# Patient Record
Sex: FEMALE | Race: WHITE | NOT HISPANIC OR LATINO | Employment: OTHER | URBAN - METROPOLITAN AREA
[De-identification: names, ages, dates, MRNs, and addresses within clinical notes are randomized per-mention and may not be internally consistent; named-entity substitution may affect disease eponyms.]

---

## 2017-02-23 ENCOUNTER — OFFICE VISIT (OUTPATIENT)
Dept: ENDOCRINOLOGY | Facility: CLINIC | Age: 62
End: 2017-02-23

## 2017-02-23 VITALS
WEIGHT: 245.1 LBS | HEART RATE: 62 BPM | DIASTOLIC BLOOD PRESSURE: 64 MMHG | HEIGHT: 65 IN | SYSTOLIC BLOOD PRESSURE: 103 MMHG | BODY MASS INDEX: 40.84 KG/M2

## 2017-02-23 DIAGNOSIS — E66.01 MORBID OBESITY DUE TO EXCESS CALORIES (H): Primary | ICD-10-CM

## 2017-02-23 NOTE — MR AVS SNAPSHOT
After Visit Summary   2017    Megha Hyman    MRN: [id number]           Patient Information     Date Of Birth          1955        Visit Information        Provider Department      2017 2:00 PM Nisha Bey RN M [facility] Medical Weight Management        Care Instructions    Handouts: 100 calorie snack list and Protein    Continue same medications    Nisha Bey RN care coordinator [phone]          Follow-ups after your visit        Your next 10 appointments already scheduled     2017  2:00 PM CDT   (Arrive by 1:45 PM)   Return Visit with MD CLEMENT Calderón [facility] Medical Weight Management ([facility] [place])    [address]  [address] [facility] [address] [phone]              Who to contact     Please call your clinic at [phone] to:    Ask questions about your health    Make or cancel appointments    Discuss your medicines    Learn about your test results    Speak to your doctor   If you have compliments or concerns about an experience at your clinic, or if you wish to file a complaint, please contact [facility] Patient Relations at [phone] or email us at [email or web address]         Additional Information About Your Visit        MyChart Information     [employer] is an electronic gateway that provides easy, online access to your medical records. With [employer], you can request a clinic appointment, read your test results, renew a prescription or communicate with your care team.     To sign up for [employer] visit the website at [email or web address]   You will be asked to enter the access code listed below, as well as some personal information. Please follow the directions to create your username and password.     Your access code is: [email or web address]  Expires: 5/10/2017  6:30 AM     Your access code will  in 90 days. If you need help or a new code, please contact  "your [facility] or call [phone] for assistance.        Care EveryWhere ID     This is your Care EveryWhere ID. This could be used by other organizations to access your [facility] medical records  [id number]        Your Vitals Were     Pulse Height BMI (Body Mass Index)             62 1.651 m (5' 5\") 40.79 kg/m2          Blood Pressure from Last 3 Encounters:   02/23/17 103/64   12/20/16 99/61   09/07/16 98/65    Weight from Last 3 Encounters:   02/23/17 111.2 kg (245 lb 1.6 oz)   12/20/16 114.4 kg (252 lb 1.6 oz)   09/07/16 117.2 kg (258 lb 4.8 oz)              Today, you had the following     No orders found for display       Primary Care Provider    None Specified       No primary provider on file.        Thank you!     Thank you for choosing [facility] WEIGHT MANAGEMENT  for your care. Our goal is always to provide you with excellent care. Hearing back from our patients is one way we can continue to improve our services. Please take a few minutes to complete the written survey that you may receive in the mail after your visit with us. Thank you!             Your Updated Medication List - Protect others around you: Learn how to safely use, store and throw away your medicines at www.disposemymeds.org.          This list is accurate as of: 2/23/17  2:47 PM.  Always use your most recent med list.                   Brand Name Dispense Instructions for use    aspirin EC 81 MG EC tablet      Take 325 mg by mouth       ATORVASTATIN CALCIUM PO      Take 40 mg by mouth       calcium carbonate 500 MG tablet    OS-DARA 500 mg [place]. [place]     Take 600 mg by mouth 2 times daily       canaliflozin tablet    INVOKANA     Take 300 mg by mouth every morning (before breakfast)       glipiZIDE 10 MG tablet    GLUCOTROL     Take 10 mg by mouth       lisinopril 5 MG tablet    PRINIVIL/ZESTRIL     Take 5 mg by mouth       metFORMIN 750 MG 24 hr tablet    GLUCOPHAGE-XR     Take 750 mg by mouth       " * RA OMEPRAZOLE 20 MG tablet   Generic drug:  omeprazole      Take 20 mg by mouth       * omeprazole 20 MG CR capsule    priLOSEC     Take 20 mg by mouth       TOLTERODINE TARTRATE PO      Take 4 mg by mouth       topiramate 50 MG tablet    TOPAMAX    60 tablet    Take 1 tablet (50 mg) by mouth 2 times daily       TRESIBA FLEXTOUCH 100 UNIT/ML pen   Generic drug:  insulin degludec      Inject 35 Units Subcutaneous daily       * Notice:  This list has 2 medication(s) that are the same as other medications prescribed for you. Read the directions carefully, and ask your doctor or other care provider to review them with you.

## 2017-02-23 NOTE — PATIENT INSTRUCTIONS
Handouts: 100 calorie snack list and Protein    Continue same medications    Nisha Bey, RN care coordinator [phone]

## 2017-02-23 NOTE — LETTER
Date:March 2, 2017      Patient was self referred, no letter generated. Do not send.        [facility] Health Information

## 2017-02-23 NOTE — LETTER
"2/23/2017       RE: Megha Hyman  [address] [place] [address]     Dear Colleague,    Thank you for referring your patient, Megha Hyman, to the [facility] MEDICAL WEIGHT MANAGEMENT at [facility]. Please see a copy of my visit note below.          Medical Weight Management Nurse Note       Megha Hyman is a 61 year old female who is seeing Medical Weight Management for treatment of obesity related to:       8/2/2016   I have the following co-morbidities associated with obesity: Type II Diabetes, Sleep Apnea, Back Pain         CURRENT WEIGHT:   245 lbs 1.6 oz    Wt Readings from Last 4 Encounters:   02/23/17 111.2 kg (245 lb 1.6 oz)   12/20/16 114.4 kg (252 lb 1.6 oz)   09/07/16 117.2 kg (258 lb 4.8 oz)   08/02/16 119.7 kg (263 lb 14.4 oz)       Height:  5' 5\"  Body Mass Index:  Body mass index is 40.79 kg/(m^2).  Vitals:  B/P: 103/64, P: 62, R: Data Unavailable     Initial consult weight was 263 lbs 14.4 oz on 08/02/16.  Weight change since last seen on 12/20/2016 is down 7 pounds.   Total loss is 18 pounds.    Diet and Activity Changes Since Last Visit Reviewed With Patient 2/23/2017   I have made the following changes to my diet since my last visit: none   With regards to my diet, I am still struggling with: sweets   For breakfast, I typically eat: cereal with either toast or bagle   For lunch, I typically eat: if at home sandwich and cottage cheese or yogugrt if at work something from the foodcourt or frozen dinner   For supper, I typically eat: left overs from eating out or takeout dont like to cook   For snack(s), I typically eat: popcorn popcicles chips    I have made the following changes to my activity/exercise since my last visit: none   With regards to my activity/exercise, I am still struggling with: going out to do extra walking but am getting my 10,000 plus steps in at work and with my skating       ROS    MEDICATIONS:   Current " "Outpatient Prescriptions   Medication     topiramate (TOPAMAX) 50 MG tablet     lisinopril (PRINIVIL,ZESTRIL) 5 MG tablet     aspirin EC 81 MG tablet     glipiZIDE (GLUCOTROL) 10 MG tablet     metFORMIN (GLUCOPHAGE-XR) 750 MG 24 hr tablet     omeprazole (RA OMEPRAZOLE) 20 MG tablet     omeprazole (PRILOSEC) 20 MG capsule     calcium carbonate (OS-DARA 500 MG [place]. [place]) 500 MG tablet     TOLTERODINE TARTRATE PO     canaliflozin (INVOKANA) tablet     ATORVASTATIN CALCIUM PO     insulin degludec (TRESIBA FLEXTOUCH) 100 UNIT/ML pen     No current facility-administered medications for this visit.        Weight Loss Medication History Reviewed With Patient 2/23/2017   Which weight loss medications are you currently taking on a regular basis?  Topamax (topiramate)   Are you having any side effects from the weight loss medication that we have prescribed you? No       ASSESSMENT:   Megha Hyman comes into clinic today at the request of Dr. Rivera for nurse follow up.  Patient is taking Topiramate 50 mg BID without side effects. Patient says she craves carbs and if she \"sees it she eats it.\" Patient also says she is struggling with sweets. Patient declined increase in Topiramate. Patient says she doesn't like to cook.  Diet recall:  B: Frosted Flakes cereal with skim milk or sugar free hot cereal, toast, bagel, wheat frozen waffle  L: sandwich, Food court at work- hot dog and apple sauce, Lean Cuisine   D: Restaurant 1/2 portion then and 1/2 later, pasta  S: popcorn, cereal    Patient's exercise complicated by bursitis in the left hip. Receiving steroid injections. Although, pt roller skates 2 times per week and Thursdays water aerobics.       PLAN:   Decrease eating out  Calorie/low fat diet  Meal planning    Handouts: 100 calorie snack list and Protein  Continue same medications      FOLLOW-UP:    In April with Dr. Whyte.    Time: 30 min spent on evaluation, management, counseling, education, & motivational interviewing " with greater than 50 % of the total time was spent on counseling and coordinating care    This service provided today was under the direct supervision of Dr. Whyte, who was available if needed.    Nisha Bey RN    Again, thank you for allowing me to participate in the care of your patient.      Sincerely,    Nisha Bey RN

## 2017-03-01 NOTE — PROGRESS NOTES
"      Medical Weight Management Nurse Note       Megha Hyman is a 61 year old female who is seeing Medical Weight Management for treatment of obesity related to:       8/2/2016   I have the following co-morbidities associated with obesity: Type II Diabetes, Sleep Apnea, Back Pain         CURRENT WEIGHT:   245 lbs 1.6 oz    Wt Readings from Last 4 Encounters:   02/23/17 111.2 kg (245 lb 1.6 oz)   12/20/16 114.4 kg (252 lb 1.6 oz)   09/07/16 117.2 kg (258 lb 4.8 oz)   08/02/16 119.7 kg (263 lb 14.4 oz)       Height:  5' 5\"  Body Mass Index:  Body mass index is 40.79 kg/(m^2).  Vitals:  B/P: 103/64, P: 62, R: Data Unavailable     Initial consult weight was 263 lbs 14.4 oz on 08/02/16.  Weight change since last seen on 12/20/2016 is down 7 pounds.   Total loss is 18 pounds.    Diet and Activity Changes Since Last Visit Reviewed With Patient 2/23/2017   I have made the following changes to my diet since my last visit: none   With regards to my diet, I am still struggling with: sweets   For breakfast, I typically eat: cereal with either toast or bagle   For lunch, I typically eat: if at home sandwich and cottage cheese or yogugrt if at work something from the foodcourt or frozen dinner   For supper, I typically eat: left overs from eating out or takeout dont like to cook   For snack(s), I typically eat: popcorn popcicles chips    I have made the following changes to my activity/exercise since my last visit: none   With regards to my activity/exercise, I am still struggling with: going out to do extra walking but am getting my 10,000 plus steps in at work and with my skating       ROS    MEDICATIONS:   Current Outpatient Prescriptions   Medication     topiramate (TOPAMAX) 50 MG tablet     lisinopril (PRINIVIL,ZESTRIL) 5 MG tablet     aspirin EC 81 MG tablet     glipiZIDE (GLUCOTROL) 10 MG tablet     metFORMIN (GLUCOPHAGE-XR) 750 MG 24 hr tablet     omeprazole (RA OMEPRAZOLE) 20 MG tablet     omeprazole (PRILOSEC) 20 " "MG capsule     calcium carbonate (OS-DARA 500 MG [place]. [place]) 500 MG tablet     TOLTERODINE TARTRATE PO     canaliflozin (INVOKANA) tablet     ATORVASTATIN CALCIUM PO     insulin degludec (TRESIBA FLEXTOUCH) 100 UNIT/ML pen     No current facility-administered medications for this visit.        Weight Loss Medication History Reviewed With Patient 2/23/2017   Which weight loss medications are you currently taking on a regular basis?  Topamax (topiramate)   Are you having any side effects from the weight loss medication that we have prescribed you? No       ASSESSMENT:   Megha Hyman comes into clinic today at the request of Dr. Rivera for nurse follow up.  Patient is taking Topiramate 50 mg BID without side effects. Patient says she craves carbs and if she \"sees it she eats it.\" Patient also says she is struggling with sweets. Patient declined increase in Topiramate. Patient says she doesn't like to cook.  Diet recall:  B: Frosted Flakes cereal with skim milk or sugar free hot cereal, toast, bagel, wheat frozen waffle  L: sandwich, Food court at work- hot dog and apple sauce, Lean Cuisine   D: Restaurant 1/2 portion then and 1/2 later, pasta  S: popcorn, cereal    Patient's exercise complicated by bursitis in the left hip. Receiving steroid injections. Although, pt roller skates 2 times per week and Thursdays water aerobics.       PLAN:   Decrease eating out  Calorie/low fat diet  Meal planning    Handouts: 100 calorie snack list and Protein  Continue same medications      FOLLOW-UP:    In April with Dr. Whyte.    Time: 30 min spent on evaluation, management, counseling, education, & motivational interviewing with greater than 50 % of the total time was spent on counseling and coordinating care    This service provided today was under the direct supervision of Dr. Whyte, who was available if needed.    Nisha Bey RN  "

## 2017-04-25 ENCOUNTER — OFFICE VISIT (OUTPATIENT)
Dept: ENDOCRINOLOGY | Facility: CLINIC | Age: 62
End: 2017-04-25

## 2017-04-25 VITALS
WEIGHT: 241.9 LBS | BODY MASS INDEX: 40.3 KG/M2 | OXYGEN SATURATION: 96 % | SYSTOLIC BLOOD PRESSURE: 90 MMHG | DIASTOLIC BLOOD PRESSURE: 59 MMHG | HEART RATE: 75 BPM | HEIGHT: 65 IN

## 2017-04-25 DIAGNOSIS — E11.9 TYPE 2 DIABETES MELLITUS WITHOUT COMPLICATION, UNSPECIFIED LONG TERM INSULIN USE STATUS: Primary | ICD-10-CM

## 2017-04-25 ASSESSMENT — ENCOUNTER SYMPTOMS
BOWEL INCONTINENCE: 0
ABDOMINAL PAIN: 0
INCREASED ENERGY: 0
NUMBNESS: 0
HOARSE VOICE: 0
BLOOD IN STOOL: 0
JOINT SWELLING: 0
SINUS CONGESTION: 0
NAUSEA: 0
STIFFNESS: 0
DOUBLE VISION: 0
TACHYCARDIA: 0
LEG PAIN: 0
NIGHT SWEATS: 0
VOMITING: 0
CONSTIPATION: 0
LIGHT-HEADEDNESS: 0
HEADACHES: 0
EYE IRRITATION: 0
SORE THROAT: 0
COUGH DISTURBING SLEEP: 0
LOSS OF CONSCIOUSNESS: 0
NERVOUS/ANXIOUS: 0
INSOMNIA: 0
CHILLS: 0
SHORTNESS OF BREATH: 0
BRUISES/BLEEDS EASILY: 0
PANIC: 0
SPEECH CHANGE: 0
HEMATURIA: 0
ARTHRALGIAS: 1
BLOATING: 0
WEAKNESS: 0
SWOLLEN GLANDS: 0
ALTERED TEMPERATURE REGULATION: 0
SNORES LOUDLY: 0
JAUNDICE: 0
CLAUDICATION: 0
COUGH: 0
SLEEP DISTURBANCES DUE TO BREATHING: 0
EXERCISE INTOLERANCE: 0
RECTAL PAIN: 0
DECREASED LIBIDO: 0
POSTURAL DYSPNEA: 0
SINUS PAIN: 0
HYPERTENSION: 0
POLYPHAGIA: 0
HYPOTENSION: 0
MEMORY LOSS: 0
MUSCLE WEAKNESS: 0
BREAST PAIN: 0
ORTHOPNEA: 0
EXTREMITY NUMBNESS: 0
HOT FLASHES: 0
FATIGUE: 0
DISTURBANCES IN COORDINATION: 0
HEARTBURN: 0
RECTAL BLEEDING: 0
RESPIRATORY PAIN: 0
HALLUCINATIONS: 0
WEIGHT GAIN: 0
SPUTUM PRODUCTION: 0
EYE WATERING: 0
DECREASED CONCENTRATION: 0
DIZZINESS: 0
TINGLING: 0
TREMORS: 0
NECK PAIN: 0
LEG SWELLING: 0
TROUBLE SWALLOWING: 0
NECK MASS: 0
TASTE DISTURBANCE: 0
WHEEZING: 0
POLYDIPSIA: 0
PARALYSIS: 0
DIARRHEA: 0
SYNCOPE: 0
EYE REDNESS: 0
HEMOPTYSIS: 0
SMELL DISTURBANCE: 0
BREAST MASS: 0
NAIL CHANGES: 0
WEIGHT LOSS: 0
SEIZURES: 0
PALPITATIONS: 0
POOR WOUND HEALING: 0
DYSPNEA ON EXERTION: 0
MYALGIAS: 0
FLANK PAIN: 0
BACK PAIN: 1
DECREASED APPETITE: 0
SKIN CHANGES: 0
DYSURIA: 0
MUSCLE CRAMPS: 1
FEVER: 0
DIFFICULTY URINATING: 0
DEPRESSION: 0
EYE PAIN: 0

## 2017-04-25 NOTE — PROGRESS NOTES
"      Return Medical Weight Management Note     Megha Hyman  MRN:  [id number]  :  1955  JEAN:  2017    Dear primary care provider,     I had the pleasure of seeing your patient Megha Hyman.  She is a 61 year old female who I am continuing to see for treatment of obesity related to: type 2 diabetes, STEPHANIE, back pain, knee pain    She has type 2 diabetes with A1C of 6.2% (3/2017), Cr 0.62 (16). She is on glipizide 10 mg daily, metformin 750 mg daily, Invokana 300 mg daily, Tresiba insulin 28 units daily, Trulicity 1.5 mg weekly.      INTERVAL HISTORY:  Last visit with me in 2016 and with Nisha Bey in 2017. She lost about 4 lbs in 2 months to be total of 22 lbs. She said that topiramate helps reducing her appetite and desire to eat. She is able to control her eating. However, she is struggling with sweet. She is active with walking. She is walking >99903 steps many days per week. She denied side effects with topiramate.     CURRENT WEIGHT:   241 lbs 14.4 oz    Wt Readings from Last 4 Encounters:   17 109.7 kg (241 lb 14.4 oz)   17 111.2 kg (245 lb 1.6 oz)   16 114.4 kg (252 lb 1.6 oz)   16 117.2 kg (258 lb 4.8 oz)       Height:  5' 5\"  Body Mass Index:  Body mass index is 40.25 kg/(m^2).  Vitals:  B/P: 99/61, P: 79,     Initial consult weight was 263 on 2016.  Weight change since last seen on 2017 is down 4 pounds.   Total loss is 22 pounds.    Diet and Activity Changes Since Last Visit Reviewed With Patient 2017   I have made the following changes to my diet since my last visit: eating more fruit   With regards to my diet, I am still struggling with: sweets   For breakfast, I typically eat: cereal & toast   For lunch, I typically eat: if at home a sandwich if at work a frozen dinner   For supper, I typically eat: if home either some left overs from eating out or a frozen dinner if at work either something from food court or from home   For " snack(s), I typically eat: popcorn chips fruit   I have made the following changes to my activity/exercise since my last visit: none   With regards to my activity/exercise, I am still struggling with: none       Review of Systems     Constitutional:  Negative for fever, chills, weight loss, weight gain, fatigue, decreased appetite, night sweats, recent stressors, height gain, height loss, post-operative complications, incisional pain, hallucinations, increased energy, hyperactivity and confused.   HENT:  Negative for ear pain, hearing loss, tinnitus, nosebleeds, trouble swallowing, hoarse voice, mouth sores, sore throat, ear discharge, tooth pain, gum tenderness, taste disturbance, smell disturbance, hearing aid, bleeding gums, dry mouth, sinus pain, sinus congestion and neck mass.    Eyes:  Negative for double vision, pain, redness, eye pain, decreased vision, eye watering, eye bulging, eye dryness, flashing lights, spots, floaters, strabismus, tunnel vision, jaundice and eye irritation.   Respiratory:   Negative for cough, hemoptysis, sputum production, shortness of breath, wheezing, sleep disturbances due to breathing, snores loudly, respiratory pain, dyspnea on exertion, cough disturbing sleep and postural dyspnea.    Cardiovascular:  Negative for chest pain, dyspnea on exertion, palpitations, orthopnea, claudication, leg swelling, fingers/toes turn blue, hypertension, hypotension, syncope, history of heart murmur, chest pain on exertion, chest pain at rest, pacemaker, few scattered varicosities, leg pain, sleep disturbances due to breathing, tachycardia, light-headedness, exercise intolerance and edema.   Gastrointestinal:  Negative for heartburn, nausea, vomiting, abdominal pain, diarrhea, constipation, blood in stool, melena, rectal pain, bloating, hemorrhoids, bowel incontinence, jaundice, rectal bleeding, coffee ground emesis and change in stool.   Genitourinary:  Negative for bladder incontinence, dysuria,  urgency, hematuria, flank pain, vaginal discharge, difficulty urinating, genital sores, dyspareunia, decreased libido, nocturia, voiding less frequently, arousal difficulty, abnormal vaginal bleeding, excessive menstruation, menstrual changes, hot flashes, vaginal dryness and postmenopausal bleeding.   Musculoskeletal:  Positive for back pain, arthralgias and muscle cramps. Negative for myalgias, joint swelling, stiffness, neck pain, bone pain, muscle weakness and fracture.   Skin:  Negative for nail changes, itching, poor wound healing, rash, hair changes, skin changes, acne, warts, poor wound healing, scarring, flaky skin, Raynaud's phenomenon, sensitivity to sunlight and skin thickening.   Neurological:  Negative for dizziness, tingling, tremors, speech change, seizures, loss of consciousness, weakness, light-headedness, numbness, headaches, disturbances in coordination, extremity numbness, memory loss, difficulty walking and paralysis.   Endo/Heme:  Negative for anemia, swollen glands and bruises/bleeds easily.   Psychiatric/Behavioral:  Negative for depression, hallucinations, memory loss, decreased concentration, mood swings and panic attacks.    Breast:  Negative for breast discharge, breast mass, breast pain and nipple retraction.   Endocrine:  Negative for altered temperature regulation, polyphagia, polydipsia, unwanted hair growth and change in facial hair.      MEDICATIONS:   Current Outpatient Prescriptions   Medication Sig Dispense Refill     insulin degludec (TRESIBA FLEXTOUCH) 100 UNIT/ML pen Inject 28 Units Subcutaneous daily 27 mL 3     topiramate (TOPAMAX) 50 MG tablet Take 1 tablet (50 mg) by mouth 2 times daily 60 tablet 3     lisinopril (PRINIVIL,ZESTRIL) 5 MG tablet Take 5 mg by mouth       aspirin EC 81 MG tablet Take 325 mg by mouth       glipiZIDE (GLUCOTROL) 10 MG tablet Take 10 mg by mouth       metFORMIN (GLUCOPHAGE-XR) 750 MG 24 hr tablet Take 750 mg by mouth       omeprazole (RA  OMEPRAZOLE) 20 MG tablet Take 20 mg by mouth       omeprazole (PRILOSEC) 20 MG capsule Take 20 mg by mouth       calcium carbonate (OS-DARA 500 MG [place]. [place]) 500 MG tablet Take 600 mg by mouth 2 times daily       TOLTERODINE TARTRATE PO Take 4 mg by mouth       canaliflozin (INVOKANA) tablet Take 300 mg by mouth every morning (before breakfast)       ATORVASTATIN CALCIUM PO Take 40 mg by mouth           Weight Loss Medication History Reviewed With Patient 4/25/2017   Which weight loss medications are you currently taking on a regular basis?  Topamax (topiramate)   Are you having any side effects from the weight loss medication that we have prescribed you? No         ASSESSMENT:   Megha Hyman is a 61 year old female who I am continuing to see for treatment of obesity related to: type 2 diabetes, STEPHANIE, back pain, knee pain    Responds well with topiramate. She is able to control her appetite.  Still struggling with sweet and bread    PLAN:   - continue topiramate 50 mg bid -- I offered to increase topiramate to 150 mg daily but she declined today and would like to stay at 100 mg daily for now. She would like to focus on diet.  - continue to focus on reducing carb and sweet  - continue with walking    FOLLOW-UP:    4 months to see me.    Time: 15 min spent on evaluation, management, counseling, education, & motivational interviewing with greater than 50 % of the total time was spent on counseling and coordinating care    Sincerely,    Isabell Loza MD

## 2017-04-25 NOTE — LETTER
"2017       RE: Megha Hyman  [address] [place] [address]     Dear Colleague,    Thank you for referring your patient, Megha Hyman, to the [facility] MEDICAL WEIGHT MANAGEMENT at [facility]. Please see a copy of my visit note below.          Return Medical Weight Management Note     Megha Hyman  MRN:  [id number]  :  1955  JEAN:  2017    Dear primary care provider,     I had the pleasure of seeing your patient Megha Hyman.  She is a 61 year old female who I am continuing to see for treatment of obesity related to: type 2 diabetes, STEPHANIE, back pain, knee pain    She has type 2 diabetes with A1C of 6.2% (3/2017), Cr 0.62 (16). She is on glipizide 10 mg daily, metformin 750 mg daily, Invokana 300 mg daily, Tresiba insulin 28 units daily, Trulicity 1.5 mg weekly.      INTERVAL HISTORY:  Last visit with me in 2016 and with Nisha Bey in 2017. She lost about 4 lbs in 2 months to be total of 22 lbs. She said that topiramate helps reducing her appetite and desire to eat. She is able to control her eating. However, she is struggling with sweet. She is active with walking. She is walking >10714 steps many days per week. She denied side effects with topiramate.     CURRENT WEIGHT:   241 lbs 14.4 oz    Wt Readings from Last 4 Encounters:   17 109.7 kg (241 lb 14.4 oz)   17 111.2 kg (245 lb 1.6 oz)   16 114.4 kg (252 lb 1.6 oz)   16 117.2 kg (258 lb 4.8 oz)       Height:  5' 5\"  Body Mass Index:  Body mass index is 40.25 kg/(m^2).  Vitals:  B/P: 99/61, P: 79,     Initial consult weight was 263 on 2016.  Weight change since last seen on 2017 is down 4 pounds.   Total loss is 22 pounds.    Diet and Activity Changes Since Last Visit Reviewed With Patient 2017   I have made the following changes to my diet since my last visit: eating more fruit   With regards to my diet, I am still " struggling with: sweets   For breakfast, I typically eat: cereal & toast   For lunch, I typically eat: if at home a sandwich if at work a frozen dinner   For supper, I typically eat: if home either some left overs from eating out or a frozen dinner if at work either something from food court or from home   For snack(s), I typically eat: popcorn chips fruit   I have made the following changes to my activity/exercise since my last visit: none   With regards to my activity/exercise, I am still struggling with: none       Review of Systems     Constitutional:  Negative for fever, chills, weight loss, weight gain, fatigue, decreased appetite, night sweats, recent stressors, height gain, height loss, post-operative complications, incisional pain, hallucinations, increased energy, hyperactivity and confused.   HENT:  Negative for ear pain, hearing loss, tinnitus, nosebleeds, trouble swallowing, hoarse voice, mouth sores, sore throat, ear discharge, tooth pain, gum tenderness, taste disturbance, smell disturbance, hearing aid, bleeding gums, dry mouth, sinus pain, sinus congestion and neck mass.    Eyes:  Negative for double vision, pain, redness, eye pain, decreased vision, eye watering, eye bulging, eye dryness, flashing lights, spots, floaters, strabismus, tunnel vision, jaundice and eye irritation.   Respiratory:   Negative for cough, hemoptysis, sputum production, shortness of breath, wheezing, sleep disturbances due to breathing, snores loudly, respiratory pain, dyspnea on exertion, cough disturbing sleep and postural dyspnea.    Cardiovascular:  Negative for chest pain, dyspnea on exertion, palpitations, orthopnea, claudication, leg swelling, fingers/toes turn blue, hypertension, hypotension, syncope, history of heart murmur, chest pain on exertion, chest pain at rest, pacemaker, few scattered varicosities, leg pain, sleep disturbances due to breathing, tachycardia, light-headedness, exercise intolerance and edema.    Gastrointestinal:  Negative for heartburn, nausea, vomiting, abdominal pain, diarrhea, constipation, blood in stool, melena, rectal pain, bloating, hemorrhoids, bowel incontinence, jaundice, rectal bleeding, coffee ground emesis and change in stool.   Genitourinary:  Negative for bladder incontinence, dysuria, urgency, hematuria, flank pain, vaginal discharge, difficulty urinating, genital sores, dyspareunia, decreased libido, nocturia, voiding less frequently, arousal difficulty, abnormal vaginal bleeding, excessive menstruation, menstrual changes, hot flashes, vaginal dryness and postmenopausal bleeding.   Musculoskeletal:  Positive for back pain, arthralgias and muscle cramps. Negative for myalgias, joint swelling, stiffness, neck pain, bone pain, muscle weakness and fracture.   Skin:  Negative for nail changes, itching, poor wound healing, rash, hair changes, skin changes, acne, warts, poor wound healing, scarring, flaky skin, Raynaud's phenomenon, sensitivity to sunlight and skin thickening.   Neurological:  Negative for dizziness, tingling, tremors, speech change, seizures, loss of consciousness, weakness, light-headedness, numbness, headaches, disturbances in coordination, extremity numbness, memory loss, difficulty walking and paralysis.   Endo/Heme:  Negative for anemia, swollen glands and bruises/bleeds easily.   Psychiatric/Behavioral:  Negative for depression, hallucinations, memory loss, decreased concentration, mood swings and panic attacks.    Breast:  Negative for breast discharge, breast mass, breast pain and nipple retraction.   Endocrine:  Negative for altered temperature regulation, polyphagia, polydipsia, unwanted hair growth and change in facial hair.      MEDICATIONS:   Current Outpatient Prescriptions   Medication Sig Dispense Refill     insulin degludec (TRESIBA FLEXTOUCH) 100 UNIT/ML pen Inject 28 Units Subcutaneous daily 27 mL 3     topiramate (TOPAMAX) 50 MG tablet Take 1 tablet (50 mg)  by mouth 2 times daily 60 tablet 3     lisinopril (PRINIVIL,ZESTRIL) 5 MG tablet Take 5 mg by mouth       aspirin EC 81 MG tablet Take 325 mg by mouth       glipiZIDE (GLUCOTROL) 10 MG tablet Take 10 mg by mouth       metFORMIN (GLUCOPHAGE-XR) 750 MG 24 hr tablet Take 750 mg by mouth       omeprazole (RA OMEPRAZOLE) 20 MG tablet Take 20 mg by mouth       omeprazole (PRILOSEC) 20 MG capsule Take 20 mg by mouth       calcium carbonate (OS-DARA 500 MG [place]. [place]) 500 MG tablet Take 600 mg by mouth 2 times daily       TOLTERODINE TARTRATE PO Take 4 mg by mouth       canaliflozin (INVOKANA) tablet Take 300 mg by mouth every morning (before breakfast)       ATORVASTATIN CALCIUM PO Take 40 mg by mouth           Weight Loss Medication History Reviewed With Patient 4/25/2017   Which weight loss medications are you currently taking on a regular basis?  Topamax (topiramate)   Are you having any side effects from the weight loss medication that we have prescribed you? No         ASSESSMENT:   Megha Hyman is a 61 year old female who I am continuing to see for treatment of obesity related to: type 2 diabetes, STEPHANIE, back pain, knee pain    Responds well with topiramate. She is able to control her appetite.  Still struggling with sweet and bread    PLAN:   - continue topiramate 50 mg bid -- I offered to increase topiramate to 150 mg daily but she declined today and would like to stay at 100 mg daily for now. She would like to focus on diet.  - continue to focus on reducing carb and sweet  - continue with walking    FOLLOW-UP:    4 months to see me.    Time: 15 min spent on evaluation, management, counseling, education, & motivational interviewing with greater than 50 % of the total time was spent on counseling and coordinating care    Sincerely,    Isabell Loza MD    Again, thank you for allowing me to participate in the care of your patient.      Sincerely,    Isabell Loza MD

## 2017-04-25 NOTE — MR AVS SNAPSHOT
After Visit Summary   2017    Megha Hyman    MRN: [id number]           Patient Information     Date Of Birth          1955        Visit Information        Provider Department      2017 2:00 PM Isabell Loza MD M [facility] Medical Weight Management        Today's Diagnoses     Type 2 diabetes mellitus without complication, unspecified long term insulin use status (H)    -  1       Follow-ups after your visit        Your next 10 appointments already scheduled     Aug 01, 2017  3:00 PM CDT   (Arrive by 2:45 PM)   Return Visit with MD CLEMENT Calderón [facility] Medical Weight Management ([facility] [place])    [address] [address] [phone]              Who to contact     Please call your clinic at [phone] to:    Ask questions about your health    Make or cancel appointments    Discuss your medicines    Learn about your test results    Speak to your doctor   If you have compliments or concerns about an experience at your clinic, or if you wish to file a complaint, please contact [facility] Patient Relations at [phone] or email us at [email or web address]         Additional Information About Your Visit        MyChart Information     [employer] is an electronic gateway that provides easy, online access to your medical records. With [employer], you can request a clinic appointment, read your test results, renew a prescription or communicate with your care team.     To sign up for [employer] visit the website at [email or web address]   You will be asked to enter the access code listed below, as well as some personal information. Please follow the directions to create your username and password.     Your access code is: [email or web address]  Expires: 5/10/2017  7:30 AM     Your access code will  in 90 days. If you need help or a new code, please contact your [facility]  [facility] or call [phone] for assistance.        Care EveryWhere ID     This is your Care EveryWhere ID. This could be used by other organizations to access your [place] medical records  [id number]        Your Vitals Were     Pulse Height Pulse Oximetry BMI (Body Mass Index)          75 1.651 m (5' 5\") 96% 40.25 kg/m2         Blood Pressure from Last 3 Encounters:   04/25/17 90/59   02/23/17 103/64   12/20/16 99/61    Weight from Last 3 Encounters:   04/25/17 109.7 kg (241 lb 14.4 oz)   02/23/17 111.2 kg (245 lb 1.6 oz)   12/20/16 114.4 kg (252 lb 1.6 oz)              Today, you had the following     No orders found for display         Today's Medication Changes          These changes are accurate as of: 4/25/17  2:26 PM.  If you have any questions, ask your nurse or doctor.               Start taking these medicines.        Dose/Directions    TRESIBA FLEXTOUCH 100 UNIT/ML pen   Used for:  Type 2 diabetes mellitus without complication, unspecified long term insulin use status (H)   Generic drug:  insulin degludec   Started by:  Isabell Loza MD        Dose:  28 Units   Inject 28 Units Subcutaneous daily   Quantity:  27 mL   Refills:  3                Primary Care Provider    None Specified       No primary provider on file.        Thank you!     Thank you for choosing [facility] WEIGHT MANAGEMENT  for your care. Our goal is always to provide you with excellent care. Hearing back from our patients is one way we can continue to improve our services. Please take a few minutes to complete the written survey that you may receive in the mail after your visit with us. Thank you!             Your Updated Medication List - Protect others around you: Learn how to safely use, store and throw away your medicines at www.disposemymeds.org.          This list is accurate as of: 4/25/17  2:26 PM.  Always use your most recent med list.                   Brand Name Dispense Instructions for " use    aspirin EC 81 MG EC tablet      Take 325 mg by mouth       ATORVASTATIN CALCIUM PO      Take 40 mg by mouth       calcium carbonate 500 MG tablet    OS-DARA 500 mg [place]. [place]     Take 600 mg by mouth 2 times daily       canaliflozin tablet    INVOKANA     Take 300 mg by mouth every morning (before breakfast)       glipiZIDE 10 MG tablet    GLUCOTROL     Take 10 mg by mouth       lisinopril 5 MG tablet    PRINIVIL/ZESTRIL     Take 5 mg by mouth       metFORMIN 750 MG 24 hr tablet    GLUCOPHAGE-XR     Take 750 mg by mouth       * RA OMEPRAZOLE 20 MG tablet   Generic drug:  omeprazole      Take 20 mg by mouth       * omeprazole 20 MG CR capsule    priLOSEC     Take 20 mg by mouth       TOLTERODINE TARTRATE PO      Take 4 mg by mouth       topiramate 50 MG tablet    TOPAMAX    60 tablet    Take 1 tablet (50 mg) by mouth 2 times daily       TRESIBA FLEXTOUCH 100 UNIT/ML pen   Generic drug:  insulin degludec     27 mL    Inject 28 Units Subcutaneous daily       * Notice:  This list has 2 medication(s) that are the same as other medications prescribed for you. Read the directions carefully, and ask your doctor or other care provider to review them with you.

## 2017-04-25 NOTE — LETTER
Date:April 26, 2017      Patient was self referred, no letter generated. Do not send.        [facility] Health Information

## 2017-05-05 DIAGNOSIS — E66.01 MORBID OBESITY DUE TO EXCESS CALORIES (H): ICD-10-CM

## 2017-05-05 RX ORDER — TOPIRAMATE 50 MG/1
50 TABLET, FILM COATED ORAL 2 TIMES DAILY
Qty: 60 TABLET | Refills: 2 | Status: SHIPPED | OUTPATIENT
Start: 2017-05-05 | End: 2017-08-01

## 2017-05-05 NOTE — TELEPHONE ENCOUNTER
Topiramate 50 mg tabs      Last Written Prescription Date:  12-20-16  Last Fill Quantity: 60,   # refills: 3  Last Office Visit : 4-25-17  Future Office visit:  8-1-17    Kathleen M Doege RN

## 2017-06-28 ENCOUNTER — THERAPY VISIT (OUTPATIENT)
Dept: PHYSICAL THERAPY | Facility: CLINIC | Age: 62
End: 2017-06-28
Payer: COMMERCIAL

## 2017-06-28 DIAGNOSIS — M25.511 RIGHT SHOULDER PAIN, UNSPECIFIED CHRONICITY: Primary | ICD-10-CM

## 2017-06-28 PROCEDURE — 97162 PT EVAL MOD COMPLEX 30 MIN: CPT | Mod: GP | Performed by: PHYSICAL THERAPIST

## 2017-06-28 PROCEDURE — 97110 THERAPEUTIC EXERCISES: CPT | Mod: GP | Performed by: PHYSICAL THERAPIST

## 2017-06-28 NOTE — PROGRESS NOTES
[facility] Initial Evaluation - Upper Extremity     Evaluation Date:  06/28/17  Referral: Dr. Horn  Employment:  sales  Work Mechanical Stresses:  Lifting, reaching, pushing/pulling, prolonged standing, repetitive tasks, computer work   Leisure Mechanical Stresses: roller skating 2-3x/week  Baselines/Functional Disability: pain with reaching overhead and pushing clothing/carts at work; unable to lay on (R) side   VAS Score (0-10):  7      HISTORY:   Patient presents with:  Intermittent achy and sharp pain  (Constant/Intermittent, Dull/Achy/Sharp/Stabbing/Throbbing)    Pain location:  (R) upper arm  Radiates to:  none  Paraesthesia:  Increased numbness/tingling compared to what she normally have from prior surgery    Present Since: 03/17/17  Commenced as a result of: fell onto right side while roller skating due to her foot being kicked out from underneath her. States she landed on right side and landed on her arm, unsure if arm was outstretched or next to her body.  Bruised her ribs and hip.    Status:  Chronic and worsening   (new/recurrent/chronic) and (improving/not improving/worsening)   Symptoms at onset: shoulder  Constant symptoms:  none  Intermittent symptoms: shoulder/upper arm    Symptoms are made worse with: reaching, pushing, pulling, when on the move, lying (R) side   (bending, sitting, turning neck, dressing, reaching, gripping; am, as the day progresses, pm; when still, when on the move; sleeping: prone, supine, side (R), side (L); other)   Symptoms are made better with:  When stil  (bending, sitting, turning neck, dressing, reaching, gripping; am, as the day progresses, pm; when still, when on the move; sleeping: prone, supine, side (R), side (L); other)   Continued use makes the pain:  worse  (better, worse, no effect)  Disturbed night:  yes  Pain at rest:  no  Previous Episodes:  RCR and deltoid repair in 1991  Previous Treatments:  none  Handedness:  right    Specific Questions  (as reported by the patient):  Do you have pain with coughing or sneezing:  no  Overall General Health:  good  Imaging/Special testing:  none  Recent or major surgery:  No   Do you have night pain:  yes  Have you had any recent accidents:  no  Have you experienced any unexplained weight loss:  no  Pertinent medical history includes: osteoporosis, diabetes, overweight, stroke, sleep disorder/apnea   Medical allergies includes: none stated  Current medications:  See attached sheet in epic   Barriers at home: unable to sleep on right side; decreased sleep  Red Flags:  none      Site(s) for physical examination: (R) SHOULDER       OBJECTIVE EXAMINATION:         AROM:     (Pain during motion: PDM; End-range Pain:  ERP)  MOVEMENT LOSS Right Left Pain   Flexion  133  EPR   Abduction  93  ERP   External Rotation      Internal Rotation      Extension  51 62 tight   Flexion/IR wnl  -   Extension/ER wnl  -     PROM:    (Pain during motion: PDM; End-range Pain:  ERP)  MOVEMENT LOSS Right Left Pain   Flexion  170  ERP   Abduction  160  ERP   External Rotation wnl  -   Internal Rotation wnl  ERP     Resisted Testing:  (Pain during motion: PDM; End-range Pain:  ERP)   Right/Pain Left/Pain   Flexion  4/+    Abduction  4+/+    External Rotation 4/+    Internal Rotation 5/-    Extension        Repeated Tests:     Pre-test symptoms:  Right shoulder 3/10        Symptoms during        Symptoms after               Movement                   Movement:         Mechanical response:      Inc ROM Dec ROM  No effect   Extension (with wand) P stretch B   abd > flex      Flexion        Ext/IR        Adduction         ER (in 90 deg flex)          Spine:  Not assessed     Provisional Classification:  Peripheral:   Derangement shoulder, extension responder     Principle of Management:   Repeated shoulder extension every 2-3 hours; 10-15 reps       HPI                    System    Physical Exam    General     ROS    Assessment/Plan:      Patient is  a 62 year old female with right side shoulder complaints.    Patient has the following significant findings with corresponding treatment plan.                Diagnosis 1:  Right Shoulder Pain Secondary fall   Pain -  hot/cold therapy, manual therapy, self management, education, directional preference exercise and home program  Decreased ROM/flexibility - manual therapy, therapeutic exercise and home program  Decreased strength - therapeutic exercise, therapeutic activities and home program  Decreased function - therapeutic activities and home program    Therapy Evaluation Codes:   1) History comprised of:   Personal factors that impact the plan of care:      Coping style, Overall behavior pattern and Time since onset of symptoms.    Comorbidity factors that impact the plan of care are:      Pain at night/rest.     Medications impacting care: None.  2) Examination of Body Systems comprised of:   Body structures and functions that impact the plan of care:      Shoulder.   Activity limitations that impact the plan of care are:      Dressing, Lifting, Working, Sleeping and Laying down.  3) Clinical presentation characteristics are:   Evolving/Changing.  4) Decision-Making    Moderate complexity using standardized patient assessment instrument and/or measureable assessment of functional outcome.  Cumulative Therapy Evaluation is: Moderate complexity.    Previous and current functional limitations:  (See Goal Flow Sheet for this information)    Short term and Long term goals: (See Goal Flow Sheet for this information)     Communication ability:  Patient appears to be able to clearly communicate and understand verbal and written communication and follow directions correctly.  Patient has an  for communication clarity.  Treatment Explanation - The following has been discussed with the patient:   RX ordered/plan of care  Anticipated outcomes  Possible risks and side effects  This patient would benefit from PT  intervention to resume normal activities.   Rehab potential is good.    Frequency:  2 X a month, once daily  Duration:  for 3 months  Discharge Plan:  Achieve all LTG.  Independent in home treatment program.  Reach maximal therapeutic benefit.    Please refer to the daily flowsheet for treatment today, total treatment time and time spent performing 1:1 timed codes.

## 2017-06-28 NOTE — LETTER
[facility]TH[facility]IC [facility] PHYSICAL THERAPY  [address]  [facility] [address]  [phone]    2017    Re: Megha Hyman   :   1955  MRN:  [id number]   REFERRING PHYSICIAN:   Brielle Horn    [facility] PHYSICAL THERAPY    Date of Initial Evaluation:  2017  Visits:    1  Reason for Referral:  Right shoulder pain, unspecified chronicity    [facility] Initial Evaluation - Upper Extremity   Evaluation Date:  17  Referral: Dr. Horn  Employment:  "[employer]  Work Mechanical Stresses:  Lifting, reaching, pushing/pulling, prolonged standing, repetitive tasks, computer work   Leisure Mechanical Stresses: roller skating 2-3x/week  Baselines/Functional Disability: pain with reaching overhead and pushing clothing/carts at work; unable to lay on (R) side   VAS Score (0-10):  7    HISTORY:   Patient presents with:  Intermittent achy and sharp pain  (Constant/Intermittent, Dull/Achy/Sharp/Stabbing/Throbbing)  Pain location:  (R) upper arm  Radiates to:  none  Paraesthesia:  Increased numbness/tingling compared to what she normally have from prior surgery  Present Since: 17  Commenced as a result of: fell onto right side while roller skating due to her foot being kicked out from underneath her. States she landed on right side and landed on her arm, unsure if arm was outstretched or next to her body.  Bruised her ribs and hip.    Status:  Chronic and worsening   (new/recurrent/chronic) and (improving/not improving/worsening)   Symptoms at onset: shoulder  Constant symptoms:  none  Intermittent symptoms: shoulder/upper arm  Symptoms are made worse with: reaching, pushing, pulling, when on the move, lying (R) side   (bending, sitting, turning neck, dressing, reaching, gripping; am, as the day progresses, pm; when still, when on the move; sleeping: prone, supine, side (R), side (L); other)   Symptoms are made better with:   When stil  (bending, sitting, turning neck, dressing, reaching, gripping; am, as the day progresses, pm; when still, when on the move; sleeping: prone, supine, side (R), side (L); other)   Continued use makes the pain:  worse  (better, worse, no effect)  Disturbed night:  yes   Pain at rest:  no  Previous Episodes:  RCR and deltoid repair in 1991  Previous Treatments:  none  Handedness:  right        Specific Questions (as reported by the patient):  Do you have pain with coughing or sneezing:  no  Overall General Health:  good  Imaging/Special testing:  none  Recent or major surgery:  No   Do you have night pain:  yes  Have you had any recent accidents:  no  Have you experienced any unexplained weight loss:  no  Pertinent medical history includes: osteoporosis, diabetes, overweight, stroke, sleep disorder/apnea   Medical allergies includes: none stated  Current medications:  See attached sheet in epic   Barriers at home: unable to sleep on right side; decreased sleep  Red Flags:  none      Site(s) for physical examination: (R) SHOULDER       OBJECTIVE EXAMINATION:         AROM:     (Pain during motion: PDM; End-range Pain:  ERP)  MOVEMENT LOSS Right Left Pain   Flexion  133  EPR   Abduction  93  ERP   External Rotation      Internal Rotation      Extension  51 62 tight   Flexion/IR wnl  -   Extension/ER wnl  -     PROM:    (Pain during motion: PDM; End-range Pain:  ERP)  MOVEMENT LOSS Right Left Pain   Flexion  170  ERP   Abduction  160  ERP   External Rotation wnl  -   Internal Rotation wnl  ERP     Resisted Testing:  (Pain during motion: PDM; End-range Pain:  ERP)   Right/Pain Left/Pain   Flexion  4/+    Abduction  4+/+    External Rotation 4/+    Internal Rotation 5/-    Extension        Repeated Tests:     Pre-test symptoms:  Right shoulder 3/10        Symptoms during        Symptoms after               Movement                   Movement:         Mechanical response:      Inc ROM Dec ROM  No effect   Extension  (with wand) P stretch B   abd > flex      Flexion        Ext/IR        Adduction         ER (in 90 deg flex)          Spine:  Not assessed     Provisional Classification:  Peripheral:   Derangement shoulder, extension responder     Principle of Management:   Repeated shoulder extension every 2-3 hours; 10-15 reps       HPI                    System    Physical Exam    General     ROS    Assessment/Plan:      Patient is a 62 year old female with right side shoulder complaints.    Patient has the following significant findings with corresponding treatment plan.                Diagnosis 1:  Right Shoulder Pain Secondary fall   Pain -  hot/cold therapy, manual therapy, self management, education, directional preference exercise and home program  Decreased ROM/flexibility - manual therapy, therapeutic exercise and home program  Decreased strength - therapeutic exercise, therapeutic activities and home program  Decreased function - therapeutic activities and home program    Therapy Evaluation Codes:   1) History comprised of:   Personal factors that impact the plan of care:      Coping style, Overall behavior pattern and Time since onset of symptoms.    Comorbidity factors that impact the plan of care are:      Pain at night/rest.     Medications impacting care: None.  2) Examination of Body Systems comprised of:   Body structures and functions that impact the plan of care:      Shoulder.   Activity limitations that impact the plan of care are:      Dressing, Lifting, Working, Sleeping and Laying down.  3) Clinical presentation characteristics are:   Evolving/Changing.  4) Decision-Making    Moderate complexity using standardized patient assessment instrument and/or measureable assessment of functional outcome.  Cumulative Therapy Evaluation is: Moderate complexity.    Previous and current functional limitations:  (See Goal Flow Sheet for this information)    Short term and Long term goals: (See Goal Flow Sheet for this  information)     Communication ability:  Patient appears to be able to clearly communicate and understand verbal and written communication and follow directions correctly.  Patient has an  for communication clarity.  Treatment Explanation - The following has been discussed with the patient:   RX ordered/plan of care  Anticipated outcomes  Possible risks and side effects  This patient would benefit from PT intervention to resume normal activities.   Rehab potential is good.    Frequency:  2 X a month, once daily  Duration:  for 3 months  Discharge Plan:  Achieve all LTG.  Independent in home treatment program.  Reach maximal therapeutic benefit.    Please refer to the daily flowsheet for treatment today, total treatment time and time spent performing 1:1 timed codes.             Thank you for your referral.    INQUIRIES  Therapist: Belkys Nunez   [facility]  [address]  [place] [address]  Phone: [phone]  Fax: [phone]

## 2017-06-28 NOTE — LETTER
[facility] ATHLETIC [facility] PHYSICAL THERAPY  [address]  [facility] [address]  [phone]    2017    Re: Megha Hyman   :   1955  MRN:  [id number]   REFERRING PHYSICIAN:   Brielle Horn    [facility] ATHLETIC [facility] PHYSICAL THERAPY    Date of Initial Evaluation:  2017  Visits:    1  Reason for Referral:  Right shoulder pain, unspecified chronicity    [facility] Initial Evaluation - Upper Extremity   Evaluation Date:  17  Referral: Dr. Horn  Employment:  [employer]  Work Mechanical Stresses:  Lifting, reaching, pushing/pulling, prolonged standing, repetitive tasks, computer work   Leisure Mechanical Stresses: roller skating 2-3x/week  Baselines/Functional Disability: pain with reaching overhead and pushing clothing/carts at work; unable to lay on (R) side   VAS Score (0-10):  7    HISTORY:   Patient presents with:  Intermittent achy and sharp pain  (Constant/Intermittent, Dull/Achy/Sharp/Stabbing/Throbbing)  Pain location:  (R) upper arm  Radiates to:  none  Paraesthesia:  Increased numbness/tingling compared to what she normally have from prior surgery  Present Since: 17  Commenced as a result of: fell onto right side while roller skating due to her foot being kicked out from underneath her. States she landed on right side and landed on her arm, unsure if arm was outstretched or next to her body.  Bruised her ribs and hip.    Status:  Chronic and worsening   (new/recurrent/chronic) and (improving/not improving/worsening)   Symptoms at onset: shoulder  Constant symptoms:  none  Intermittent symptoms: shoulder/upper arm  Symptoms are made worse with: reaching, pushing, pulling, when on the move, lying (R) side   (bending, sitting, turning neck, dressing, reaching, gripping; am, as the day progresses, pm; when still, when on the move; sleeping: prone, supine, side (R), side (L); other)   Symptoms are made better with:   When stil  (bending, sitting, turning neck, dressing, reaching, gripping; am, as the day progresses, pm; when still, when on the move; sleeping: prone, supine, side (R), side (L); other)   Continued use makes the pain:  worse  (better, worse, no effect)  HISTORY (continued)  Disturbed night:  yes  Pain at rest:  no  Previous Episodes:  RCR and deltoid repair in 1991  Previous Treatments:  none  Handedness:  right    Specific Questions (as reported by the patient):  Do you have pain with coughing or sneezing:  no  Overall General Health:  good  Imaging/Special testing:  none  Recent or major surgery:  No   Do you have night pain:  yes  Have you had any recent accidents:  no  Have you experienced any unexplained weight loss:  no  Pertinent medical history includes: osteoporosis, diabetes, overweight, stroke, sleep disorder/apnea   Medical allergies includes: none stated  Current medications:  See attached sheet in epic   Barriers at home: unable to sleep on right side; decreased sleep  Red Flags:  none    Site(s) for physical examination: (R) SHOULDER     OBJECTIVE EXAMINATION:   AROM:     (Pain during motion: PDM; End-range Pain:  ERP)  MOVEMENT LOSS Right Left Pain   Flexion  133  EPR   Abduction  93  ERP   External Rotation      Internal Rotation      Extension  51 62 tight   Flexion/IR wnl  -   Extension/ER wnl  -   PROM:    (Pain during motion: PDM; End-range Pain:  ERP)  MOVEMENT LOSS Right Left Pain   Flexion  170  ERP   Abduction  160  ERP   External Rotation wnl  -   Internal Rotation wnl  ERP   Resisted Testing:  (Pain during motion: PDM; End-range Pain:  ERP)   Right/Pain Left/Pain   Flexion  4/+    Abduction  4+/+    External Rotation 4/+    Internal Rotation 5/-    Extension        Repeated Tests:   Pre-test symptoms:  Right shoulder 3/10      Symptoms during        Symptoms after               Movement                   Movement:         Mechanical response:      Inc ROM Dec ROM  No effect   Extension  (with wand) P stretch B   abd > flex      Flexion        Ext/IR        Adduction         ER (in 90 deg flex)          Spine:  Not assessed   Provisional Classification:  Peripheral:   Derangement shoulder, extension responder   Principle of Management:   Repeated shoulder extension every 2-3 hours; 10-15 reps     Assessment/Plan:    Patient is a 62 year old female with right side shoulder complaints.    Patient has the following significant findings with corresponding treatment plan.                Diagnosis 1:  Right Shoulder Pain Secondary fall   Pain -  hot/cold therapy, manual therapy, self management, education, directional preference exercise and home program  Decreased ROM/flexibility - manual therapy, therapeutic exercise and home program  Decreased strength - therapeutic exercise, therapeutic activities and home program  Decreased function - therapeutic activities and home program    Therapy Evaluation Codes:   1) History comprised of:   Personal factors that impact the plan of care:      Coping style, Overall behavior pattern and Time since onset of symptoms.    Comorbidity factors that impact the plan of care are:      Pain at night/rest.     Medications impacting care: None.  2) Examination of Body Systems comprised of:   Body structures and functions that impact the plan of care:      Shoulder.   Activity limitations that impact the plan of care are:      Dressing, Lifting, Working, Sleeping and Laying down.  3) Clinical presentation characteristics are:   Evolving/Changing.  4) Decision-Making    Moderate complexity using standardized patient assessment instrument and/or measureable assessment of functional outcome.  Cumulative Therapy Evaluation is: Moderate complexity.      Previous and current functional limitations:  (See Goal Flow Sheet for this information)    Short term and Long term goals: (See Goal Flow Sheet for this information)   Communication ability:  Patient appears to be able to clearly  communicate and understand verbal and written communication and follow directions correctly.  Patient has an  for communication clarity.  Treatment Explanation - The following has been discussed with the patient:   RX ordered/plan of care, Anticipated outcomes, Possible risks and side effects    This patient would benefit from PT intervention to resume normal activities.   Rehab potential is good.  Frequency:  2 X a month, once daily  Duration:  for 3 months  Discharge Plan:  Achieve all LTG.  Independent in home treatment program.  Reach maximal therapeutic benefit.    Thank you for your referral.    INQUIRIES        Therapist:  JS CasanovaT, cert MDT  [facility] PHYSICAL THERAP[facility]  [address] [facility] [address]  [facility] [address]  Phone: [phone]  Fax: [phone]

## 2017-07-20 ENCOUNTER — THERAPY VISIT (OUTPATIENT)
Dept: PHYSICAL THERAPY | Facility: CLINIC | Age: 62
End: 2017-07-20
Payer: COMMERCIAL

## 2017-07-20 DIAGNOSIS — M25.511 RIGHT SHOULDER PAIN, UNSPECIFIED CHRONICITY: ICD-10-CM

## 2017-07-20 PROCEDURE — 97110 THERAPEUTIC EXERCISES: CPT | Mod: GP | Performed by: PHYSICAL THERAPIST

## 2017-07-27 ENCOUNTER — THERAPY VISIT (OUTPATIENT)
Dept: PHYSICAL THERAPY | Facility: CLINIC | Age: 62
End: 2017-07-27
Payer: COMMERCIAL

## 2017-07-27 DIAGNOSIS — M25.511 RIGHT SHOULDER PAIN, UNSPECIFIED CHRONICITY: ICD-10-CM

## 2017-07-27 PROCEDURE — 97110 THERAPEUTIC EXERCISES: CPT | Mod: GP | Performed by: PHYSICAL THERAPIST

## 2017-08-01 ENCOUNTER — OFFICE VISIT (OUTPATIENT)
Dept: ENDOCRINOLOGY | Facility: CLINIC | Age: 62
End: 2017-08-01

## 2017-08-01 VITALS
HEIGHT: 65 IN | HEART RATE: 83 BPM | BODY MASS INDEX: 39.87 KG/M2 | WEIGHT: 239.3 LBS | DIASTOLIC BLOOD PRESSURE: 63 MMHG | SYSTOLIC BLOOD PRESSURE: 106 MMHG | OXYGEN SATURATION: 96 %

## 2017-08-01 DIAGNOSIS — E66.01 MORBID OBESITY DUE TO EXCESS CALORIES (H): ICD-10-CM

## 2017-08-01 RX ORDER — TOPIRAMATE 50 MG/1
TABLET, FILM COATED ORAL
Qty: 270 TABLET | Refills: 1 | Status: SHIPPED | OUTPATIENT
Start: 2017-08-01 | End: 2018-01-09

## 2017-08-01 RX ORDER — TIMOLOL MALEATE 5 MG/ML
1 SOLUTION/ DROPS OPHTHALMIC 2 TIMES DAILY
COMMUNITY

## 2017-08-01 RX ORDER — LATANOPROST 50 UG/ML
1 SOLUTION/ DROPS OPHTHALMIC AT BEDTIME
COMMUNITY

## 2017-08-01 RX ORDER — DORZOLAMIDE HYDROCHLORIDE AND TIMOLOL MALEATE 20; 5 MG/ML; MG/ML
1 SOLUTION/ DROPS OPHTHALMIC 2 TIMES DAILY
COMMUNITY

## 2017-08-01 ASSESSMENT — ENCOUNTER SYMPTOMS
POLYPHAGIA: 0
TINGLING: 0
MUSCLE CRAMPS: 1
NAUSEA: 0
SNORES LOUDLY: 0
EXTREMITY NUMBNESS: 0
DECREASED LIBIDO: 0
ARTHRALGIAS: 1
SKIN CHANGES: 0
FLANK PAIN: 0
COUGH DISTURBING SLEEP: 0
INCREASED ENERGY: 0
WHEEZING: 0
SHORTNESS OF BREATH: 0
SYNCOPE: 0
POOR WOUND HEALING: 0
VOMITING: 0
DYSURIA: 0
DOUBLE VISION: 0
SINUS PAIN: 0
TACHYCARDIA: 0
RECTAL BLEEDING: 0
WEAKNESS: 0
SLEEP DISTURBANCES DUE TO BREATHING: 0
HEADACHES: 0
EYE PAIN: 0
BLOATING: 0
EYE IRRITATION: 1
POSTURAL DYSPNEA: 0
BACK PAIN: 1
NERVOUS/ANXIOUS: 0
EYE REDNESS: 0
CLAUDICATION: 0
NAIL CHANGES: 0
HOT FLASHES: 0
BLOOD IN STOOL: 0
HEARTBURN: 0
FATIGUE: 0
HEMATURIA: 0
DIARRHEA: 0
DISTURBANCES IN COORDINATION: 0
MUSCLE WEAKNESS: 0
SORE THROAT: 0
NECK MASS: 0
LOSS OF CONSCIOUSNESS: 0
PANIC: 0
CHILLS: 0
SWOLLEN GLANDS: 0
EYE IRRITATION: 0
TASTE DISTURBANCE: 0
ORTHOPNEA: 0
MYALGIAS: 0
BRUISES/BLEEDS EASILY: 0
NUMBNESS: 0
WEIGHT GAIN: 0
PARALYSIS: 0
EYE WATERING: 0
LEG SWELLING: 0
LEG PAIN: 0
LIGHT-HEADEDNESS: 0
HEMOPTYSIS: 0
ABDOMINAL PAIN: 0
POLYDIPSIA: 0
TROUBLE SWALLOWING: 0
HYPOTENSION: 0
BOWEL INCONTINENCE: 0
DIFFICULTY URINATING: 0
JAUNDICE: 0
NECK PAIN: 1
DECREASED APPETITE: 0
HALLUCINATIONS: 0
HOARSE VOICE: 0
STIFFNESS: 0
SPEECH CHANGE: 0
TREMORS: 0
SEIZURES: 0
EYE PAIN: 1
HYPERTENSION: 0
CONSTIPATION: 0
RECTAL PAIN: 0
EXERCISE INTOLERANCE: 0
BREAST PAIN: 0
SINUS CONGESTION: 0
BREAST MASS: 0
DECREASED CONCENTRATION: 0
RESPIRATORY PAIN: 0
PALPITATIONS: 0
NIGHT SWEATS: 0
NECK PAIN: 0
ALTERED TEMPERATURE REGULATION: 0
WEIGHT LOSS: 0
SMELL DISTURBANCE: 0
MUSCLE CRAMPS: 0
COUGH: 0
INSOMNIA: 0
SPUTUM PRODUCTION: 0
EYE WATERING: 1
JOINT SWELLING: 0
MEMORY LOSS: 0
DEPRESSION: 0
FEVER: 0
DIZZINESS: 0
DYSPNEA ON EXERTION: 0

## 2017-08-01 NOTE — NURSING NOTE
"Chief Complaint   Patient presents with     Weight Problem     RMWM       Vitals:    08/01/17 1454   BP: 106/63   Pulse: 83   SpO2: 96%   Weight: 239 lb 4.8 oz   Height: 5' 5\"       Body mass index is 39.82 kg/(m^2).    Gillian Alvarez CMA                          "

## 2017-08-01 NOTE — LETTER
"2017       RE: Megha Hyman  [address] [facility] [address]     Dear Colleague,    Thank you for referring your patient, Megha Hyman, to the [facility] MEDICAL WEIGHT MANAGEMENT at [facility]. Please see a copy of my visit note below.          Return Medical Weight Management Note     Megha Hyman  MRN:  [id number]  :  1955  JEAN:  2017    Dear primary care provider,     I had the pleasure of seeing your patient Megha Hyman.  She is a 62 year old female who I am continuing to see for treatment of obesity related to: type 2 diabetes, STEPHANIE, back pain, knee pain    She has type 2 diabetes with A1C of 6.2% (3/2017), Cr 0.62 (16). She is on glipizide 10 mg daily, metformin 750 mg daily, Invokana 300 mg daily, Tresiba insulin 28 units daily, Trulicity 1.5 mg weekly.      INTERVAL HISTORY:  Last visit with me in 2017. She lost about 2 lbs in 2 months to be total of 24 lbs. She said that topiramate helps reducing her appetite and desire to eat. However, she has increased stress eating over the past few months. She fell while skating and hurt her right side and arm so she is doing physical therapy for this. She is walking >66818 steps about 2 days per week. She denied side effects with topiramate.     CURRENT WEIGHT:   239 lbs 4.8 oz    Wt Readings from Last 4 Encounters:   17 108.5 kg (239 lb 4.8 oz)   17 109.7 kg (241 lb 14.4 oz)   17 111.2 kg (245 lb 1.6 oz)   16 114.4 kg (252 lb 1.6 oz)       Height:  5' 5\"  Body Mass Index:  Body mass index is 39.82 kg/(m^2).  Vitals:  B/P: 99/61, P: 79,     Initial consult weight was 263 on 2016.  Weight change since last seen on 2017 is down 2 pounds.   Total loss is 24 pounds.    Diet and Activity Changes Since Last Visit Reviewed With Patient 2017   I have made the following changes to my diet since my last visit: eating more fruit   With regards to my " diet, I am still struggling with: bread   For breakfast, I typically eat: cereal   For lunch, I typically eat: sandwich with lunch meat or left overs from eating out   For supper, I typically eat: pasta, chicken or a frozen dinner   For snack(s), I typically eat: yogurt, fruit, chips, popcile,    I have made the following changes to my activity/exercise since my last visit: none   With regards to my activity/exercise, I am still struggling with: getting out to walk because of hip and knee pain in therapy       Review of Systems     Constitutional:  Negative for fever, chills, weight loss, weight gain, fatigue, decreased appetite, night sweats, recent stressors, height gain, height loss, post-operative complications, incisional pain, hallucinations, increased energy, hyperactivity and confused.   HENT:  Negative for ear pain, hearing loss, tinnitus, nosebleeds, trouble swallowing, hoarse voice, mouth sores, sore throat, ear discharge, tooth pain, gum tenderness, taste disturbance, smell disturbance, hearing aid, bleeding gums, dry mouth, sinus pain, sinus congestion and neck mass.    Eyes:  Negative for double vision, pain, redness, eye pain, decreased vision, eye watering, eye bulging, eye dryness, flashing lights, spots, floaters, strabismus, tunnel vision, jaundice and eye irritation.   Respiratory:   Negative for cough, hemoptysis, sputum production, shortness of breath, wheezing, sleep disturbances due to breathing, snores loudly, respiratory pain, dyspnea on exertion, cough disturbing sleep and postural dyspnea.    Cardiovascular:  Negative for chest pain, dyspnea on exertion, palpitations, orthopnea, claudication, leg swelling, fingers/toes turn blue, hypertension, hypotension, syncope, history of heart murmur, chest pain on exertion, chest pain at rest, pacemaker, few scattered varicosities, leg pain, sleep disturbances due to breathing, tachycardia, light-headedness, exercise intolerance and edema.    Gastrointestinal:  Negative for heartburn, nausea, vomiting, abdominal pain, diarrhea, constipation, blood in stool, melena, rectal pain, bloating, hemorrhoids, bowel incontinence, jaundice, rectal bleeding, coffee ground emesis and change in stool.   Genitourinary:  Negative for bladder incontinence, dysuria, urgency, hematuria, flank pain, vaginal discharge, difficulty urinating, genital sores, dyspareunia, decreased libido, nocturia, voiding less frequently, arousal difficulty, abnormal vaginal bleeding, excessive menstruation, menstrual changes, hot flashes, vaginal dryness and postmenopausal bleeding.   Musculoskeletal:  Positive for back pain, arthralgias and muscle cramps. Negative for myalgias, joint swelling, stiffness, neck pain, bone pain, muscle weakness and fracture.   Skin:  Negative for nail changes, itching, poor wound healing, rash, hair changes, skin changes, acne, warts, poor wound healing, scarring, flaky skin, Raynaud's phenomenon, sensitivity to sunlight and skin thickening.   Neurological:  Negative for dizziness, tingling, tremors, speech change, seizures, loss of consciousness, weakness, light-headedness, numbness, headaches, disturbances in coordination, extremity numbness, memory loss, difficulty walking and paralysis.   Endo/Heme:  Negative for anemia, swollen glands and bruises/bleeds easily.   Psychiatric/Behavioral:  Negative for depression, hallucinations, memory loss, decreased concentration, mood swings and panic attacks.    Breast:  Negative for breast discharge, breast mass, breast pain and nipple retraction.   Endocrine:  Negative for altered temperature regulation, polyphagia, polydipsia, unwanted hair growth and change in facial hair.      MEDICATIONS:   Current Outpatient Prescriptions   Medication Sig Dispense Refill     topiramate (TOPAMAX) 50 MG tablet Take 1 tablet (50 mg) by mouth 2 times daily 60 tablet 2     insulin degludec (TRESIBA FLEXTOUCH) 100 UNIT/ML pen Inject  28 Units Subcutaneous daily 27 mL 3     lisinopril (PRINIVIL,ZESTRIL) 5 MG tablet Take 5 mg by mouth       aspirin EC 81 MG tablet Take 325 mg by mouth       glipiZIDE (GLUCOTROL) 10 MG tablet Take 10 mg by mouth       metFORMIN (GLUCOPHAGE-XR) 750 MG 24 hr tablet Take 750 mg by mouth       omeprazole (RA OMEPRAZOLE) 20 MG tablet Take 20 mg by mouth       omeprazole (PRILOSEC) 20 MG capsule Take 20 mg by mouth       calcium carbonate (OS-DARA 500 MG [place]. [place]) 500 MG tablet Take 600 mg by mouth 2 times daily       TOLTERODINE TARTRATE PO Take 4 mg by mouth       canaliflozin (INVOKANA) tablet Take 300 mg by mouth every morning (before breakfast)       ATORVASTATIN CALCIUM PO Take 40 mg by mouth           Weight Loss Medication History Reviewed With Patient 8/1/2017   Which weight loss medications are you currently taking on a regular basis?  Topamax (topiramate)   Are you having any side effects from the weight loss medication that we have prescribed you? No         ASSESSMENT:   Megha Hyman is a 62 year old female who I am continuing to see for treatment of obesity related to: type 2 diabetes, STEPHANIE, back pain, knee pain    Responds well with topiramate. She is able to control her appetite but she has had stress eating over the past few months  Still struggling with sweet and bread    PLAN:   - increase topiramate to 150 mg daily   - continue to focus on reducing carb and sweet  - continue with walking    FOLLOW-UP:    5 months to see me.    Time: 15 min spent on evaluation, management, counseling, education, & motivational interviewing with greater than 50 % of the total time was spent on counseling and coordinating care    Sincerely,    Isabell Loza MD    Again, thank you for allowing me to participate in the care of your patient.      Sincerely,    Isabell Loza MD

## 2017-08-01 NOTE — MR AVS SNAPSHOT
After Visit Summary   8/1/2017    Megha Hyman    MRN: [id number]           Patient Information     Date Of Birth          1955        Visit Information        Provider Department      8/1/2017 3:00 PM Isabell Loza MD [facility] Medical Weight Management        Care Instructions    - please increase topamax to 50 mg in the morning and 100 mg in the evening  - try to walk more  - see me in 5 months    If you have any questions, please do not hesitate to call Weight management clinic at [phone] or [phone].    Sincerely,    Isabell Loza MD  Endocrinology            Follow-ups after your visit        Follow-up notes from your care team     Return in about 5 months (around 1/1/2018).      Your next 10 appointments already scheduled     Aug 03, 2017  2:50 PM CDT   [facility] Ext[facility]ity with Brayden Gorman PT   [facility] ([facility])    [address] [address]  [facility] [id number] [phone]              Who to contact     Please call your clinic at [phone] to:    Ask questions about your health    Make or cancel appointments    Discuss your medicines    Learn about your test results    Speak to your doctor   If you have compliments or concerns about an experience at your clinic, or if you wish to file a complaint, please contact [facility] Patient Relations at [phone] or email us at [email or web address]         Additional Information About Your Visit        MyChart Information     Atari is an electronic gateway that provides easy, online access to your medical records. With Atari, you can request a clinic appointment, read your test results, renew a prescription or communicate with your care team.     To sign up for Atari visit the website at [email or web address]   You will be asked to enter the access code listed below, as well as some personal  "information. Please follow the directions to create your username and password.     Your access code is: [email or web address]  Expires: 10/16/2017  6:31 AM     Your access code will  in 90 days. If you need help or a new code, please contact your [facility] or call [phone] for assistance.        Care EveryWhere ID     This is your Care EveryWhere ID. This could be used by other organizations to access your [facility] medical records  [id number]        Your Vitals Were     Pulse Height Pulse Oximetry BMI (Body Mass Index)          83 1.651 m (5' 5\") 96% 39.82 kg/m2         Blood Pressure from Last 3 Encounters:   17 106/63   17 90/59   17 103/64    Weight from Last 3 Encounters:   17 108.5 kg (239 lb 4.8 oz)   17 109.7 kg (241 lb 14.4 oz)   17 111.2 kg (245 lb 1.6 oz)              Today, you had the following     No orders found for display         Today's Medication Changes          These changes are accurate as of: 17  3:17 PM.  If you have any questions, ask your nurse or doctor.               Stop taking these medicines if you haven't already. Please contact your care team if you have questions.     canaliflozin tablet   Commonly known as:  INVOKANA   Stopped by:  Isabell Loza MD           TRESIBA FLEXTOUCH 100 UNIT/ML pen   Generic drug:  insulin degludec   Stopped by:  Isabell Loza MD                    Primary Care Provider    None Specified       No primary provider on file.        Equal Access to Services     [facility]: Hadii kenzie monteso Soroshan, waaxda luqadaha, qaybta kaalmada haven amezquita . So [facility] [phone].    ATENCIÓN: Si habla español, tiene a swenson disposición servicios gratuitos de asistencia lingüística. Llame al [phone].    We comply with applicable federal civil rights laws and [place] laws. We do not discriminate on the basis of race, color, " national origin, age, disability sex, sexual orientation or gender identity.            Thank you!     Thank you for choosing [facility] MEDICAL WEIGHT MANAGEMENT  for your care. Our goal is always to provide you with excellent care. Hearing back from our patients is one way we can continue to improve our services. Please take a few minutes to complete the written survey that you may receive in the mail after your visit with us. Thank you!             Your Updated Medication List - Protect others around you: Learn how to safely use, store and throw away your medicines at www.disposemymeds.org.          This list is accurate as of: 8/1/17  3:17 PM.  Always use your most recent med list.                   Brand Name Dispense Instructions for use Diagnosis    aspirin EC 81 MG EC tablet      Take 325 mg by mouth        ATORVASTATIN CALCIUM PO      Take 40 mg by mouth        calcium carbonate 1250 MG tablet    OS-DARA 500 mg [place]am. Ca     Take 600 mg by mouth 2 times daily        dorzolamide-timolol 2-0.5 % ophthalmic solution    COSOPT     1 drop 2 times daily        dulaglutide 1.5 MG/0.5ML pen    TRULICITY     Inject 1.5 mg Subcutaneous        glipiZIDE 10 MG tablet    GLUCOTROL     Take 10 mg by mouth        insulin glargine 100 UNIT/ML injection    LANTUS     Inject 28 Units Subcutaneous        JARDIANCE 25 MG Tabs tablet   Generic drug:  empagliflozin      Take 25 mg by mouth daily        latanoprost 0.005 % ophthalmic solution    XALATAN     1 drop At Bedtime        lisinopril 5 MG tablet    PRINIVIL/ZESTRIL     Take 5 mg by mouth        MELOXICAM PO      Take 7.5 mg by mouth        metFORMIN 750 MG 24 hr tablet    GLUCOPHAGE-XR     Take 750 mg by mouth        * RA OMEPRAZOLE 20 MG tablet   Generic drug:  omeprazole      Take 20 mg by mouth        * omeprazole 20 MG CR capsule    priLOSEC     Take 20 mg by mouth        timolol 0.5 % ophthalmic solution    TIMOPTIC     1 drop 2 times daily        TOLTERODINE TARTRATE  PO      Take 4 mg by mouth        topiramate 50 MG tablet    TOPAMAX    60 tablet    Take 1 tablet (50 mg) by mouth 2 times daily    Morbid obesity due to excess calories (H)       * Notice:  This list has 2 medication(s) that are the same as other medications prescribed for you. Read the directions carefully, and ask your doctor or other care provider to review them with you.

## 2017-08-01 NOTE — PROGRESS NOTES
"      Return Medical Weight Management Note     Megha Hyman  MRN:  [id number]  :  1955  JEAN:  2017    Dear primary care provider,     I had the pleasure of seeing your patient Megha Hyman.  She is a 62 year old female who I am continuing to see for treatment of obesity related to: type 2 diabetes, STEPHANIE, back pain, knee pain    She has type 2 diabetes with A1C of 6.2% (3/2017), Cr 0.62 (16). She is on glipizide 10 mg daily, metformin 750 mg daily, Invokana 300 mg daily, Tresiba insulin 28 units daily, Trulicity 1.5 mg weekly.      INTERVAL HISTORY:  Last visit with me in 2017. She lost about 2 lbs in 2 months to be total of 24 lbs. She said that topiramate helps reducing her appetite and desire to eat. However, she has increased stress eating over the past few months. She fell while skating and hurt her right side and arm so she is doing physical therapy for this. She is walking >78616 steps about 2 days per week. She denied side effects with topiramate.     CURRENT WEIGHT:   239 lbs 4.8 oz    Wt Readings from Last 4 Encounters:   17 108.5 kg (239 lb 4.8 oz)   17 109.7 kg (241 lb 14.4 oz)   17 111.2 kg (245 lb 1.6 oz)   16 114.4 kg (252 lb 1.6 oz)       Height:  5' 5\"  Body Mass Index:  Body mass index is 39.82 kg/(m^2).  Vitals:  B/P: 99/61, P: 79,     Initial consult weight was 263 on 2016.  Weight change since last seen on 2017 is down 2 pounds.   Total loss is 24 pounds.    Diet and Activity Changes Since Last Visit Reviewed With Patient 2017   I have made the following changes to my diet since my last visit: eating more fruit   With regards to my diet, I am still struggling with: bread   For breakfast, I typically eat: cereal   For lunch, I typically eat: sandwich with lunch meat or left overs from eating out   For supper, I typically eat: pasta, chicken or a frozen dinner   For snack(s), I typically eat: yogurt, fruit, chips, popcile,    I have made " the following changes to my activity/exercise since my last visit: none   With regards to my activity/exercise, I am still struggling with: getting out to walk because of hip and knee pain in therapy       Review of Systems     Constitutional:  Negative for fever, chills, weight loss, weight gain, fatigue, decreased appetite, night sweats, recent stressors, height gain, height loss, post-operative complications, incisional pain, hallucinations, increased energy, hyperactivity and confused.   HENT:  Negative for ear pain, hearing loss, tinnitus, nosebleeds, trouble swallowing, hoarse voice, mouth sores, sore throat, ear discharge, tooth pain, gum tenderness, taste disturbance, smell disturbance, hearing aid, bleeding gums, dry mouth, sinus pain, sinus congestion and neck mass.    Eyes:  Negative for double vision, pain, redness, eye pain, decreased vision, eye watering, eye bulging, eye dryness, flashing lights, spots, floaters, strabismus, tunnel vision, jaundice and eye irritation.   Respiratory:   Negative for cough, hemoptysis, sputum production, shortness of breath, wheezing, sleep disturbances due to breathing, snores loudly, respiratory pain, dyspnea on exertion, cough disturbing sleep and postural dyspnea.    Cardiovascular:  Negative for chest pain, dyspnea on exertion, palpitations, orthopnea, claudication, leg swelling, fingers/toes turn blue, hypertension, hypotension, syncope, history of heart murmur, chest pain on exertion, chest pain at rest, pacemaker, few scattered varicosities, leg pain, sleep disturbances due to breathing, tachycardia, light-headedness, exercise intolerance and edema.   Gastrointestinal:  Negative for heartburn, nausea, vomiting, abdominal pain, diarrhea, constipation, blood in stool, melena, rectal pain, bloating, hemorrhoids, bowel incontinence, jaundice, rectal bleeding, coffee ground emesis and change in stool.   Genitourinary:  Negative for bladder incontinence, dysuria,  urgency, hematuria, flank pain, vaginal discharge, difficulty urinating, genital sores, dyspareunia, decreased libido, nocturia, voiding less frequently, arousal difficulty, abnormal vaginal bleeding, excessive menstruation, menstrual changes, hot flashes, vaginal dryness and postmenopausal bleeding.   Musculoskeletal:  Positive for back pain, arthralgias and muscle cramps. Negative for myalgias, joint swelling, stiffness, neck pain, bone pain, muscle weakness and fracture.   Skin:  Negative for nail changes, itching, poor wound healing, rash, hair changes, skin changes, acne, warts, poor wound healing, scarring, flaky skin, Raynaud's phenomenon, sensitivity to sunlight and skin thickening.   Neurological:  Negative for dizziness, tingling, tremors, speech change, seizures, loss of consciousness, weakness, light-headedness, numbness, headaches, disturbances in coordination, extremity numbness, memory loss, difficulty walking and paralysis.   Endo/Heme:  Negative for anemia, swollen glands and bruises/bleeds easily.   Psychiatric/Behavioral:  Negative for depression, hallucinations, memory loss, decreased concentration, mood swings and panic attacks.    Breast:  Negative for breast discharge, breast mass, breast pain and nipple retraction.   Endocrine:  Negative for altered temperature regulation, polyphagia, polydipsia, unwanted hair growth and change in facial hair.      MEDICATIONS:   Current Outpatient Prescriptions   Medication Sig Dispense Refill     topiramate (TOPAMAX) 50 MG tablet Take 1 tablet (50 mg) by mouth 2 times daily 60 tablet 2     insulin degludec (TRESIBA FLEXTOUCH) 100 UNIT/ML pen Inject 28 Units Subcutaneous daily 27 mL 3     lisinopril (PRINIVIL,ZESTRIL) 5 MG tablet Take 5 mg by mouth       aspirin EC 81 MG tablet Take 325 mg by mouth       glipiZIDE (GLUCOTROL) 10 MG tablet Take 10 mg by mouth       metFORMIN (GLUCOPHAGE-XR) 750 MG 24 hr tablet Take 750 mg by mouth       omeprazole (RA  OMEPRAZOLE) 20 MG tablet Take 20 mg by mouth       omeprazole (PRILOSEC) 20 MG capsule Take 20 mg by mouth       calcium carbonate (OS-DARA 500 MG [place]. CA) 500 MG tablet Take 600 mg by mouth 2 times daily       TOLTERODINE TARTRATE PO Take 4 mg by mouth       canaliflozin (INVOKANA) tablet Take 300 mg by mouth every morning (before breakfast)       ATORVASTATIN CALCIUM PO Take 40 mg by mouth           Weight Loss Medication History Reviewed With Patient 8/1/2017   Which weight loss medications are you currently taking on a regular basis?  Topamax (topiramate)   Are you having any side effects from the weight loss medication that we have prescribed you? No         ASSESSMENT:   Megha Hyman is a 62 year old female who I am continuing to see for treatment of obesity related to: type 2 diabetes, STEPHANIE, back pain, knee pain    Responds well with topiramate. She is able to control her appetite but she has had stress eating over the past few months  Still struggling with sweet and bread    PLAN:   - increase topiramate to 150 mg daily   - continue to focus on reducing carb and sweet  - continue with walking    FOLLOW-UP:    5 months to see me.    Time: 15 min spent on evaluation, management, counseling, education, & motivational interviewing with greater than 50 % of the total time was spent on counseling and coordinating care    Sincerely,    Isabell Loza MD

## 2017-08-01 NOTE — PATIENT INSTRUCTIONS
- please increase topamax to 50 mg in the morning and 100 mg in the evening  - try to walk more  - see me in 5 months    If you have any questions, please do not hesitate to call Weight management clinic at [phone] or [phone].    Sincerely,    Isabell Loza MD  Endocrinology

## 2017-08-01 NOTE — LETTER
Date:August 2, 2017      Patient was self referred, no letter generated. Do not send.        [facility] Health Information

## 2017-11-03 PROBLEM — M25.511 RIGHT SHOULDER PAIN, UNSPECIFIED CHRONICITY: Status: RESOLVED | Noted: 2017-06-28 | Resolved: 2017-11-03

## 2017-11-03 NOTE — PROGRESS NOTES
"Discharge Note    Progress reporting period is from initial eval to Jul 27, 2017.     Megha failed to return for next follow up visit and current status is unknown.  Please see information below for last relevant information on current status.  Patient seen for Rxs Used: 3 visits.  SUBJECTIVE  Subjective changes noted by patient:  Subjective: Pt reports shoulder pain gets worse while doing and after exercise.  Noticing more pain with reaching.   Has been doing HEP 3 x daily, 15-20 reps each time.  Icing afterwards does help to relieve pain.  .  Current pain level is Current Pain level: 7/10.     Previous pain level was   .   Changes in function:  Yes (See Goal flowsheet attached for changes in current functional level)  Adverse reaction to treatment or activity: None    OBJECTIVE  Changes noted in objective findings: Objective: AROM:  Flex 150; Abd 145; Flex/ER WNL; Ext/IR 1\" difference.  Strength:  FF 4+/5; Abd 4/5; ER 5/5; IR 5/5     ASSESSMENT/PLAN  Diagnosis: (R) shldr pain (fall)   DIAGP:  The encounter diagnosis was Right shoulder pain, unspecified chronicity.  Updated problem list and treatment plan:   Pain - HEP  Decreased ROM/flexibility - HEP  Decreased function - HEP  Decreased strength - HEP  STG/LTGs have been met or progress has been made towards goals:  Yes, please see goal flowsheet for most current information  Assessment of Progress: current status is unknown.  Last current status: Assessment of progress: Pt is progressing slower than anticipated   Self Management Plans:  HEP  I have re-evaluated this patient and find that the nature, scope, duration and intensity of the therapy is appropriate for the medical condition of the patient.  Megha continues to require the following intervention to meet STG and LTG's:  HEP.    Recommendations:  Discharge with current home program.  Patient to follow up with MD as needed.    Please refer to the daily flowsheet for treatment today, total treatment time " and time spent performing 1:1 timed codes.

## 2018-01-09 ENCOUNTER — OFFICE VISIT (OUTPATIENT)
Dept: ENDOCRINOLOGY | Facility: CLINIC | Age: 63
End: 2018-01-09
Payer: COMMERCIAL

## 2018-01-09 VITALS
SYSTOLIC BLOOD PRESSURE: 96 MMHG | HEIGHT: 65 IN | DIASTOLIC BLOOD PRESSURE: 57 MMHG | BODY MASS INDEX: 38.27 KG/M2 | HEART RATE: 56 BPM | OXYGEN SATURATION: 96 % | WEIGHT: 229.7 LBS

## 2018-01-09 DIAGNOSIS — E66.01 MORBID OBESITY DUE TO EXCESS CALORIES (H): ICD-10-CM

## 2018-01-09 RX ORDER — TOPIRAMATE 50 MG/1
TABLET, FILM COATED ORAL
Qty: 270 TABLET | Refills: 2 | Status: SHIPPED | OUTPATIENT
Start: 2018-01-09 | End: 2018-06-25

## 2018-01-09 RX ORDER — OXYBUTYNIN CHLORIDE 10 MG/1
TABLET, EXTENDED RELEASE ORAL
COMMUNITY
Start: 2017-10-30

## 2018-01-09 ASSESSMENT — ENCOUNTER SYMPTOMS
TACHYCARDIA: 0
CONSTIPATION: 0
POOR WOUND HEALING: 0
RESPIRATORY PAIN: 0
DECREASED CONCENTRATION: 0
SHORTNESS OF BREATH: 0
CLAUDICATION: 0
RECTAL BLEEDING: 0
HYPERTENSION: 0
BLOOD IN STOOL: 0
NAUSEA: 0
INCREASED ENERGY: 0
VOMITING: 0
HEMOPTYSIS: 0
MYALGIAS: 0
BRUISES/BLEEDS EASILY: 0
DIFFICULTY URINATING: 0
TROUBLE SWALLOWING: 0
LIGHT-HEADEDNESS: 0
STIFFNESS: 0
DYSURIA: 0
WHEEZING: 0
SNORES LOUDLY: 0
HEMATURIA: 0
SMELL DISTURBANCE: 0
TREMORS: 0
INSOMNIA: 0
NUMBNESS: 0
DEPRESSION: 0
LEG SWELLING: 0
SYNCOPE: 0
DIZZINESS: 0
RECTAL PAIN: 0
BREAST MASS: 0
SPEECH CHANGE: 0
DISTURBANCES IN COORDINATION: 0
FATIGUE: 0
SEIZURES: 0
TINGLING: 0
NECK MASS: 0
ARTHRALGIAS: 1
PARALYSIS: 0
EYE WATERING: 0
FEVER: 0
EXERCISE INTOLERANCE: 0
POLYPHAGIA: 0
DOUBLE VISION: 0
COUGH: 0
EXTREMITY NUMBNESS: 0
JAUNDICE: 0
EYE IRRITATION: 0
LOSS OF CONSCIOUSNESS: 0
SWOLLEN GLANDS: 0
COUGH DISTURBING SLEEP: 0
ORTHOPNEA: 0
HOT FLASHES: 0
EYE PAIN: 0
DIARRHEA: 0
NIGHT SWEATS: 0
PALPITATIONS: 0
BOWEL INCONTINENCE: 0
HOARSE VOICE: 0
NERVOUS/ANXIOUS: 0
NECK PAIN: 0
BACK PAIN: 1
MUSCLE CRAMPS: 1
CHILLS: 0
SORE THROAT: 0
SKIN CHANGES: 0
SPUTUM PRODUCTION: 0
WEIGHT LOSS: 0
HEADACHES: 0
FLANK PAIN: 0
DECREASED APPETITE: 0
LEG PAIN: 0
MUSCLE WEAKNESS: 0
PANIC: 0
ABDOMINAL PAIN: 0
ALTERED TEMPERATURE REGULATION: 0
EYE REDNESS: 0
SINUS PAIN: 0
DECREASED LIBIDO: 0
POLYDIPSIA: 0
BREAST PAIN: 0
SINUS CONGESTION: 0
DYSPNEA ON EXERTION: 0
WEIGHT GAIN: 0
MEMORY LOSS: 0
TASTE DISTURBANCE: 0
BLOATING: 0
HEARTBURN: 0
SLEEP DISTURBANCES DUE TO BREATHING: 0
WEAKNESS: 0
HALLUCINATIONS: 0
POSTURAL DYSPNEA: 0
JOINT SWELLING: 0
HYPOTENSION: 0
NAIL CHANGES: 0

## 2018-01-09 NOTE — LETTER
"2018       RE: Megha Hyman  [address] [facility] [address]     Dear Colleague,    Thank you for referring your patient, Megha Hyman, to the [facility] MEDICAL WEIGHT MANAGEMENT at [facility]. Please see a copy of my visit note below.          Return Medical Weight Management Note     Megha Hyman  MRN:  [id number]  :  1955  JEAN:  2018    Dear primary care provider,     I had the pleasure of seeing your patient Megha Hyman.  She is a 62 year old female who I am continuing to see for treatment of obesity related to: type 2 diabetes, STEPHANIE, back pain, knee pain    She has type 2 diabetes with A1C of 6.7% (10/2017), Cr 0.7 (2017). She is on glipizide 10 mg daily, metformin 750 mg daily, Jardiance 25 mg daily, Tresiba insulin 28 units daily, Trulicity 1.5 mg weekly.      INTERVAL HISTORY:  Last visit with me in 2017. She lost about 10 lbs in 5 months to be total of 34 lbs. She said that topiramate helps reducing her appetite and desire to eat. She is taking topiramate 50 mg in the AM and 100 mg in PM. She tries to portion her meal when she ate out. She does not cook. She usually uses meal replacement method.     She still has stress and emotional eating. She is walking >23024 steps about 2 days per week when she works. She denied side effects with topiramate.     She was put on gabapentin for neuropathy.    CURRENT WEIGHT:   229 lbs 11.2 oz    Wt Readings from Last 4 Encounters:   17 108.5 kg (239 lb 4.8 oz)   17 109.7 kg (241 lb 14.4 oz)   17 111.2 kg (245 lb 1.6 oz)   16 114.4 kg (252 lb 1.6 oz)       Height:  5' 5\"  Body Mass Index:  Body mass index is 38.22 kg/(m^2).  Vitals:  B/P: 99/61, P: 79,     Initial consult weight was 263 on 2016.  Weight change since last seen on 2017 is down 10 pounds.   Total loss is 34 pounds.    Diet and Activity Changes Since Last Visit Reviewed With Patient " 1/9/2018   I have made the following changes to my diet since my last visit: eating more salads and friets   With regards to my diet, I am still struggling with: time as to when to eat. my work hours are all di the placeand its hard to eat on a regular time.   For breakfast, I typically eat: cerale or lw surgar hot oat meal or low janie waffels   For lunch, I typically eat: if at work food court or frozen dinners if at home sandwiches   For supper, I typically eat: -   For snack(s), I typically eat: -   I have made the following changes to my activity/exercise since my last visit: -   With regards to my activity/exercise, I am still struggling with: -       Review of Systems     Constitutional:  Negative for fever, chills, weight loss, weight gain, fatigue, decreased appetite, night sweats, recent stressors, height gain, height loss, post-operative complications, incisional pain, hallucinations, increased energy, hyperactivity and confused.   HENT:  Negative for ear pain, hearing loss, tinnitus, nosebleeds, trouble swallowing, hoarse voice, mouth sores, sore throat, ear discharge, tooth pain, gum tenderness, taste disturbance, smell disturbance, hearing aid, bleeding gums, dry mouth, sinus pain, sinus congestion and neck mass.    Eyes:  Negative for double vision, pain, redness, eye pain, decreased vision, eye watering, eye bulging, eye dryness, flashing lights, spots, floaters, strabismus, tunnel vision, jaundice and eye irritation.   Respiratory:   Negative for cough, hemoptysis, sputum production, shortness of breath, wheezing, sleep disturbances due to breathing, snores loudly, respiratory pain, dyspnea on exertion, cough disturbing sleep and postural dyspnea.    Cardiovascular:  Negative for chest pain, dyspnea on exertion, palpitations, orthopnea, claudication, leg swelling, fingers/toes turn blue, hypertension, hypotension, syncope, history of heart murmur, chest pain on exertion, chest pain at rest,  pacemaker, few scattered varicosities, leg pain, sleep disturbances due to breathing, tachycardia, light-headedness, exercise intolerance and edema.   Gastrointestinal:  Negative for heartburn, nausea, vomiting, abdominal pain, diarrhea, constipation, blood in stool, melena, rectal pain, bloating, hemorrhoids, bowel incontinence, jaundice, rectal bleeding, coffee ground emesis and change in stool.   Genitourinary:  Negative for bladder incontinence, dysuria, urgency, hematuria, flank pain, vaginal discharge, difficulty urinating, genital sores, dyspareunia, decreased libido, nocturia, voiding less frequently, arousal difficulty, abnormal vaginal bleeding, excessive menstruation, menstrual changes, hot flashes, vaginal dryness and postmenopausal bleeding.   Musculoskeletal:  Positive for back pain, arthralgias and muscle cramps. Negative for myalgias, joint swelling, stiffness, neck pain, bone pain, muscle weakness and fracture.   Skin:  Negative for nail changes, itching, poor wound healing, rash, hair changes, skin changes, acne, warts, poor wound healing, scarring, flaky skin, Raynaud's phenomenon, sensitivity to sunlight and skin thickening.   Neurological:  Negative for dizziness, tingling, tremors, speech change, seizures, loss of consciousness, weakness, light-headedness, numbness, headaches, disturbances in coordination, extremity numbness, memory loss, difficulty walking and paralysis.   Endo/Heme:  Negative for anemia, swollen glands and bruises/bleeds easily.   Psychiatric/Behavioral:  Negative for depression, hallucinations, memory loss, decreased concentration, mood swings and panic attacks.    Breast:  Negative for breast discharge, breast mass, breast pain and nipple retraction.   Endocrine:  Negative for altered temperature regulation, polyphagia, polydipsia, unwanted hair growth and change in facial hair.      MEDICATIONS:   Current Outpatient Prescriptions   Medication Sig Dispense Refill      dulaglutide (TRULICITY) 1.5 MG/0.5ML pen Inject 1.5 mg Subcutaneous       MELOXICAM PO Take 7.5 mg by mouth       insulin glargine (LANTUS) 100 UNIT/ML injection Inject 28 Units Subcutaneous       empagliflozin (JARDIANCE) 25 MG TABS tablet Take 25 mg by mouth daily       timolol (TIMOPTIC) 0.5 % ophthalmic solution 1 drop 2 times daily       latanoprost (XALATAN) 0.005 % ophthalmic solution 1 drop At Bedtime       dorzolamide-timolol (COSOPT) 2-0.5 % ophthalmic solution 1 drop 2 times daily       topiramate (TOPAMAX) 50 MG tablet 50 mg in the morning and 100 mg in the evening 270 tablet 1     lisinopril (PRINIVIL,ZESTRIL) 5 MG tablet Take 5 mg by mouth       aspirin EC 81 MG tablet Take 325 mg by mouth       glipiZIDE (GLUCOTROL) 10 MG tablet Take 10 mg by mouth       metFORMIN (GLUCOPHAGE-XR) 750 MG 24 hr tablet Take 750 mg by mouth       omeprazole (RA OMEPRAZOLE) 20 MG tablet Take 20 mg by mouth       omeprazole (PRILOSEC) 20 MG capsule Take 20 mg by mouth       calcium carbonate (OS-DARA 500 MG [place]) 500 MG tablet Take 600 mg by mouth 2 times daily       TOLTERODINE TARTRATE PO Take 4 mg by mouth       ATORVASTATIN CALCIUM PO Take 40 mg by mouth           Weight Loss Medication History Reviewed With Patient 8/1/2017   Which weight loss medications are you currently taking on a regular basis?  Topamax (topiramate)   Are you having any side effects from the weight loss medication that we have prescribed you? No     Lab reviewed    ASSESSMENT:   Megha Hyman is a 62 year old female who I am continuing to see for treatment of obesity related to: type 2 diabetes, STEPHANIE, back pain, knee pain    Responds well with topiramate. She is able to control her appetite.  Still struggling with sweet and bread  +stress/emotional eating    Lost 10 lbs in 5 months.    PLAN:   - continue topiramate to 150 mg daily   - continue to focus on reducing carb and sweet  - continue with smaller food portion  - continue with  walking    FOLLOW-UP:    4-5 months to see me.    Time: 15 min spent on evaluation, management, counseling, education, & motivational interviewing with greater than 50 % of the total time was spent on counseling and coordinating care    Sincerely,    Isabell Loza MD    Again, thank you for allowing me to participate in the care of your patient.      Sincerely,    Isabell Loza MD

## 2018-01-09 NOTE — LETTER
Date:January 10, 2018      Patient was self referred, no letter generated. Do not send.        [facility] Health Information

## 2018-01-09 NOTE — PROGRESS NOTES
"      Return Medical Weight Management Note     Megha Hyman  MRN:  [id number]  :  1955  JEAN:  2018    Dear primary care provider,     I had the pleasure of seeing your patient Megha Hyman.  She is a 62 year old female who I am continuing to see for treatment of obesity related to: type 2 diabetes, STEPHANIE, back pain, knee pain    She has type 2 diabetes with A1C of 6.7% (10/2017), Cr 0.7 (2017). She is on glipizide 10 mg daily, metformin 750 mg daily, Jardiance 25 mg daily, Tresiba insulin 28 units daily, Trulicity 1.5 mg weekly.      INTERVAL HISTORY:  Last visit with me in 2017. She lost about 10 lbs in 5 months to be total of 34 lbs. She said that topiramate helps reducing her appetite and desire to eat. She is taking topiramate 50 mg in the AM and 100 mg in PM. She tries to portion her meal when she ate out. She does not cook. She usually uses meal replacement method.     She still has stress and emotional eating. She is walking >41701 steps about 2 days per week when she works. She denied side effects with topiramate.     She was put on gabapentin for neuropathy.    CURRENT WEIGHT:   229 lbs 11.2 oz    Wt Readings from Last 4 Encounters:   17 108.5 kg (239 lb 4.8 oz)   17 109.7 kg (241 lb 14.4 oz)   17 111.2 kg (245 lb 1.6 oz)   16 114.4 kg (252 lb 1.6 oz)       Height:  5' 5\"  Body Mass Index:  Body mass index is 38.22 kg/(m^2).  Vitals:  B/P: 99/61, P: 79,     Initial consult weight was 263 on 2016.  Weight change since last seen on 2017 is down 10 pounds.   Total loss is 34 pounds.    Diet and Activity Changes Since Last Visit Reviewed With Patient 2018   I have made the following changes to my diet since my last visit: eating more salads and friets   With regards to my diet, I am still struggling with: time as to when to eat. my work hours are all di the placeand its hard to eat on a regular time.   For breakfast, I typically eat: ana or clarke " surgar hot oat meal or low janie waffels   For lunch, I typically eat: if at work food court or frozen dinners if at home sandwiches   For supper, I typically eat: -   For snack(s), I typically eat: -   I have made the following changes to my activity/exercise since my last visit: -   With regards to my activity/exercise, I am still struggling with: -       Review of Systems     Constitutional:  Negative for fever, chills, weight loss, weight gain, fatigue, decreased appetite, night sweats, recent stressors, height gain, height loss, post-operative complications, incisional pain, hallucinations, increased energy, hyperactivity and confused.   HENT:  Negative for ear pain, hearing loss, tinnitus, nosebleeds, trouble swallowing, hoarse voice, mouth sores, sore throat, ear discharge, tooth pain, gum tenderness, taste disturbance, smell disturbance, hearing aid, bleeding gums, dry mouth, sinus pain, sinus congestion and neck mass.    Eyes:  Negative for double vision, pain, redness, eye pain, decreased vision, eye watering, eye bulging, eye dryness, flashing lights, spots, floaters, strabismus, tunnel vision, jaundice and eye irritation.   Respiratory:   Negative for cough, hemoptysis, sputum production, shortness of breath, wheezing, sleep disturbances due to breathing, snores loudly, respiratory pain, dyspnea on exertion, cough disturbing sleep and postural dyspnea.    Cardiovascular:  Negative for chest pain, dyspnea on exertion, palpitations, orthopnea, claudication, leg swelling, fingers/toes turn blue, hypertension, hypotension, syncope, history of heart murmur, chest pain on exertion, chest pain at rest, pacemaker, few scattered varicosities, leg pain, sleep disturbances due to breathing, tachycardia, light-headedness, exercise intolerance and edema.   Gastrointestinal:  Negative for heartburn, nausea, vomiting, abdominal pain, diarrhea, constipation, blood in stool, melena, rectal pain, bloating, hemorrhoids,  bowel incontinence, jaundice, rectal bleeding, coffee ground emesis and change in stool.   Genitourinary:  Negative for bladder incontinence, dysuria, urgency, hematuria, flank pain, vaginal discharge, difficulty urinating, genital sores, dyspareunia, decreased libido, nocturia, voiding less frequently, arousal difficulty, abnormal vaginal bleeding, excessive menstruation, menstrual changes, hot flashes, vaginal dryness and postmenopausal bleeding.   Musculoskeletal:  Positive for back pain, arthralgias and muscle cramps. Negative for myalgias, joint swelling, stiffness, neck pain, bone pain, muscle weakness and fracture.   Skin:  Negative for nail changes, itching, poor wound healing, rash, hair changes, skin changes, acne, warts, poor wound healing, scarring, flaky skin, Raynaud's phenomenon, sensitivity to sunlight and skin thickening.   Neurological:  Negative for dizziness, tingling, tremors, speech change, seizures, loss of consciousness, weakness, light-headedness, numbness, headaches, disturbances in coordination, extremity numbness, memory loss, difficulty walking and paralysis.   Endo/Heme:  Negative for anemia, swollen glands and bruises/bleeds easily.   Psychiatric/Behavioral:  Negative for depression, hallucinations, memory loss, decreased concentration, mood swings and panic attacks.    Breast:  Negative for breast discharge, breast mass, breast pain and nipple retraction.   Endocrine:  Negative for altered temperature regulation, polyphagia, polydipsia, unwanted hair growth and change in facial hair.      MEDICATIONS:   Current Outpatient Prescriptions   Medication Sig Dispense Refill     dulaglutide (TRULICITY) 1.5 MG/0.5ML pen Inject 1.5 mg Subcutaneous       MELOXICAM PO Take 7.5 mg by mouth       insulin glargine (LANTUS) 100 UNIT/ML injection Inject 28 Units Subcutaneous       empagliflozin (JARDIANCE) 25 MG TABS tablet Take 25 mg by mouth daily       timolol (TIMOPTIC) 0.5 % ophthalmic solution  1 drop 2 times daily       latanoprost (XALATAN) 0.005 % ophthalmic solution 1 drop At Bedtime       dorzolamide-timolol (COSOPT) 2-0.5 % ophthalmic solution 1 drop 2 times daily       topiramate (TOPAMAX) 50 MG tablet 50 mg in the morning and 100 mg in the evening 270 tablet 1     lisinopril (PRINIVIL,ZESTRIL) 5 MG tablet Take 5 mg by mouth       aspirin EC 81 MG tablet Take 325 mg by mouth       glipiZIDE (GLUCOTROL) 10 MG tablet Take 10 mg by mouth       metFORMIN (GLUCOPHAGE-XR) 750 MG 24 hr tablet Take 750 mg by mouth       omeprazole (RA OMEPRAZOLE) 20 MG tablet Take 20 mg by mouth       omeprazole (PRILOSEC) 20 MG capsule Take 20 mg by mouth       calcium carbonate (OS-DARA 500 MG [place]. [place]) 500 MG tablet Take 600 mg by mouth 2 times daily       TOLTERODINE TARTRATE PO Take 4 mg by mouth       ATORVASTATIN CALCIUM PO Take 40 mg by mouth           Weight Loss Medication History Reviewed With Patient 8/1/2017   Which weight loss medications are you currently taking on a regular basis?  Topamax (topiramate)   Are you having any side effects from the weight loss medication that we have prescribed you? No     Lab reviewed    ASSESSMENT:   Megha Hyman is a 62 year old female who I am continuing to see for treatment of obesity related to: type 2 diabetes, STEPHANIE, back pain, knee pain    Responds well with topiramate. She is able to control her appetite.  Still struggling with sweet and bread  +stress/emotional eating    Lost 10 lbs in 5 months.    PLAN:   - continue topiramate to 150 mg daily   - continue to focus on reducing carb and sweet  - continue with smaller food portion  - continue with walking    FOLLOW-UP:    4-5 months to see me.    Time: 15 min spent on evaluation, management, counseling, education, & motivational interviewing with greater than 50 % of the total time was spent on counseling and coordinating care    Sincerely,    Isabell Loza MD

## 2018-01-09 NOTE — PATIENT INSTRUCTIONS
[email or web address]    Eat slow    Continue with exercise    See you back in 4-5 months    If you have any questions, please do not hesitate to call Weight management clinic at [phone] or [phone].    Sincerely,    Isabell Loza MD  Endocrinology

## 2018-01-09 NOTE — NURSING NOTE
"Chief Complaint   Patient presents with     Weight Problem     RMWM       Vitals:    01/09/18 1149   BP: 96/57   Pulse: 56   SpO2: 96%   Weight: 229 lb 11.2 oz   Height: 5' 5\"       Body mass index is 38.22 kg/(m^2).  Gillian Alvarez CMA                          "

## 2018-01-09 NOTE — MR AVS SNAPSHOT
After Visit Summary   2018    Megha Hyman    MRN: [id number]           Patient Information     Date Of Birth          1955        Visit Information        Provider Department      2018 11:45 AM Isabell Loza MD [facility] Medical Weight Management        Care Instructions    [email or web address]    Eat slow    Continue with exercise    See you back in 4-5 months    If you have any questions, please do not hesitate to call Weight management clinic at [phone] or [phone].    Sincerely,    Isabell Loza MD  Endocrinology            Follow-ups after your visit        Who to contact     Please call your clinic at [phone] to:    Ask questions about your health    Make or cancel appointments    Discuss your medicines    Learn about your test results    Speak to your doctor   If you have compliments or concerns about an experience at your clinic, or if you wish to file a complaint, please contact [facility] Patient Relations at [phone] or email us at [email or web address]         Additional Information About Your Visit        MyChart Information     [employer] is an electronic gateway that provides easy, online access to your medical records. With "[employer] you can request a clinic appointment, read your test results, renew a prescription or communicate with your care team.     To sign up for [employer] visit the website at [email or web address]   You will be asked to enter the access code listed below, as well as some personal information. Please follow the directions to create your username and password.     Your access code is: [email or web address]  Expires: 3/26/2018  6:31 AM     Your access code will  in 90 days. If you need help or a new code, please contact your [facility] or call [phone] for assistance.        Care EveryWhere ID     This is your Care EveryWhere ID. This  "could be used by other organizations to access your [facility] medical records  [id number]        Your Vitals Were     Pulse Height Pulse Oximetry BMI (Body Mass Index)          56 1.651 m (5' 5\") 96% 38.22 kg/m2         Blood Pressure from Last 3 Encounters:   01/09/18 96/57   08/01/17 106/63   04/25/17 90/59    Weight from Last 3 Encounters:   01/09/18 104.2 kg (229 lb 11.2 oz)   08/01/17 108.5 kg (239 lb 4.8 oz)   04/25/17 109.7 kg (241 lb 14.4 oz)              Today, you had the following     No orders found for display       Primary Care Provider    None Specified       No primary provider on file.        Equal Access to Services     RD SALEH : Leonardo Cat, wadax cerrato, qaybstefani kaalmakristi amezquita, haven sepulveda . So [facility] [phone].    ATENCIÓN: Si habla español, tiene a swenson disposición servicios gratuitos de asistencia lingüística. [facility] [phone].    We comply with applicable federal civil rights laws and [place] laws. We do not discriminate on the basis of race, color, national origin, age, disability, sex, sexual orientation, or gender identity.            Thank you!     Thank you for choosing [facility] WEIGHT MANAGEMENT  for your care. Our goal is always to provide you with excellent care. Hearing back from our patients is one way we can continue to improve our services. Please take a few minutes to complete the written survey that you may receive in the mail after your visit with us. Thank you!             Your Updated Medication List - Protect others around you: Learn how to safely use, store and throw away your medicines at www.disposemymeds.org.          This list is accurate as of: 1/9/18 12:05 PM.  Always use your most recent med list.                   Brand Name Dispense Instructions for use Diagnosis    aspirin EC 81 MG EC tablet      Take 325 mg by mouth        ATORVASTATIN CALCIUM PO      Take 40 mg by mouth        calcium " carbonate 1250 MG tablet    OS-DARA 500 mg [place]. [place]     Take 600 mg by mouth 2 times daily        dorzolamide-timolol 2-0.5 % ophthalmic solution    COSOPT     1 drop 2 times daily        dulaglutide 1.5 MG/0.5ML pen    TRULICITY     Inject 1.5 mg Subcutaneous        glipiZIDE 10 MG tablet    GLUCOTROL     Take 10 mg by mouth        insulin glargine 100 UNIT/ML injection    LANTUS     Inject 28 Units Subcutaneous        JARDIANCE 25 MG Tabs tablet   Generic drug:  empagliflozin      Take 25 mg by mouth daily        latanoprost 0.005 % ophthalmic solution    XALATAN     1 drop At Bedtime        lisinopril 5 MG tablet    PRINIVIL/ZESTRIL     Take 5 mg by mouth        MELOXICAM PO      Take 7.5 mg by mouth        metFORMIN 750 MG 24 hr tablet    GLUCOPHAGE-XR     Take 750 mg by mouth        oxybutynin 10 MG 24 hr tablet    DITROPAN-XL     TAKE ONE TABLET BY MOUTH ONE TIME DAILY        * RA OMEPRAZOLE 20 MG tablet   Generic drug:  omeprazole      Take 20 mg by mouth        * omeprazole 20 MG CR capsule    priLOSEC     Take 20 mg by mouth        timolol 0.5 % ophthalmic solution    TIMOPTIC     1 drop 2 times daily        TOLTERODINE TARTRATE PO      Take 4 mg by mouth        topiramate 50 MG tablet    TOPAMAX    270 tablet    50 mg in the morning and 100 mg in the evening    Morbid obesity due to excess calories (H)       * Notice:  This list has 2 medication(s) that are the same as other medications prescribed for you. Read the directions carefully, and ask your doctor or other care provider to review them with you.

## 2018-06-25 ENCOUNTER — OFFICE VISIT (OUTPATIENT)
Dept: ENDOCRINOLOGY | Facility: CLINIC | Age: 63
End: 2018-06-25
Payer: COMMERCIAL

## 2018-06-25 VITALS
BODY MASS INDEX: 39.32 KG/M2 | DIASTOLIC BLOOD PRESSURE: 57 MMHG | HEART RATE: 83 BPM | OXYGEN SATURATION: 97 % | SYSTOLIC BLOOD PRESSURE: 97 MMHG | WEIGHT: 236 LBS | HEIGHT: 65 IN

## 2018-06-25 DIAGNOSIS — R30.0 DYSURIA: ICD-10-CM

## 2018-06-25 DIAGNOSIS — E66.01 MORBID OBESITY DUE TO EXCESS CALORIES (H): Primary | ICD-10-CM

## 2018-06-25 LAB
ALBUMIN UR-MCNC: NEGATIVE MG/DL
APPEARANCE UR: CLEAR
BACTERIA #/AREA URNS HPF: ABNORMAL /HPF
BILIRUB UR QL STRIP: NEGATIVE
COLOR UR AUTO: YELLOW
GLUCOSE UR STRIP-MCNC: >499 MG/DL
HGB UR QL STRIP: NEGATIVE
KETONES UR STRIP-MCNC: NEGATIVE MG/DL
LEUKOCYTE ESTERASE UR QL STRIP: ABNORMAL
MUCOUS THREADS #/AREA URNS LPF: PRESENT /LPF
NITRATE UR QL: NEGATIVE
PH UR STRIP: 5 PH (ref 5–7)
RBC #/AREA URNS AUTO: <1 /HPF (ref 0–2)
SOURCE: ABNORMAL
SP GR UR STRIP: 1.03 (ref 1–1.03)
SQUAMOUS #/AREA URNS AUTO: 1 /HPF (ref 0–1)
UROBILINOGEN UR STRIP-MCNC: 0 MG/DL (ref 0–2)
WBC #/AREA URNS AUTO: 50 /HPF (ref 0–5)

## 2018-06-25 RX ORDER — TOPIRAMATE 50 MG/1
TABLET, FILM COATED ORAL
Qty: 90 TABLET | Refills: 5 | Status: SHIPPED | OUTPATIENT
Start: 2018-06-25 | End: 2019-01-04

## 2018-06-25 ASSESSMENT — ENCOUNTER SYMPTOMS
INSOMNIA: 1
ABDOMINAL PAIN: 0
ORTHOPNEA: 0
SWOLLEN GLANDS: 0
NECK MASS: 0
MEMORY LOSS: 0
ALTERED TEMPERATURE REGULATION: 0
HOARSE VOICE: 0
HEARTBURN: 0
BOWEL INCONTINENCE: 0
SYNCOPE: 0
INCREASED ENERGY: 0
WEAKNESS: 0
MYALGIAS: 0
DISTURBANCES IN COORDINATION: 0
BREAST PAIN: 0
SKIN CHANGES: 0
EYE PAIN: 0
COUGH: 0
HYPOTENSION: 0
DIFFICULTY URINATING: 0
JOINT SWELLING: 0
SPUTUM PRODUCTION: 0
DIARRHEA: 0
EYE WATERING: 0
DOUBLE VISION: 0
VOMITING: 0
BRUISES/BLEEDS EASILY: 0
RECTAL PAIN: 0
EXTREMITY NUMBNESS: 0
MUSCLE WEAKNESS: 0
PANIC: 0
DEPRESSION: 0
LEG PAIN: 0
NIGHT SWEATS: 0
DECREASED CONCENTRATION: 0
TASTE DISTURBANCE: 0
LIGHT-HEADEDNESS: 0
MUSCLE CRAMPS: 0
DYSURIA: 1
HYPERTENSION: 0
FLANK PAIN: 0
EXERCISE INTOLERANCE: 0
POLYPHAGIA: 0
CONSTIPATION: 0
NAUSEA: 0
CHILLS: 0
NERVOUS/ANXIOUS: 1
WHEEZING: 0
LEG SWELLING: 0
HEADACHES: 0
DECREASED APPETITE: 0
DIZZINESS: 0
TINGLING: 0
CLAUDICATION: 0
DECREASED LIBIDO: 0
BLOATING: 0
ARTHRALGIAS: 1
NERVOUS/ANXIOUS: 0
STIFFNESS: 0
DEPRESSION: 1
HEMOPTYSIS: 0
BACK PAIN: 1
SORE THROAT: 0
NECK PAIN: 1
SMELL DISTURBANCE: 0
SPEECH CHANGE: 0
PALPITATIONS: 0
SNORES LOUDLY: 0
FATIGUE: 0
WEIGHT GAIN: 0
RESPIRATORY PAIN: 0
HOT FLASHES: 0
EYE REDNESS: 0
JOINT SWELLING: 1
HEMATURIA: 0
SLEEP DISTURBANCES DUE TO BREATHING: 0
SINUS PAIN: 0
TREMORS: 0
RECTAL BLEEDING: 0
MUSCLE CRAMPS: 1
JAUNDICE: 0
PARALYSIS: 0
DYSPNEA ON EXERTION: 0
DYSURIA: 0
TROUBLE SWALLOWING: 0
HALLUCINATIONS: 0
LOSS OF CONSCIOUSNESS: 0
NAIL CHANGES: 0
POLYDIPSIA: 0
DIFFICULTY URINATING: 1
POSTURAL DYSPNEA: 0
COUGH DISTURBING SLEEP: 0
FEVER: 0
NUMBNESS: 0
BLOOD IN STOOL: 0
SHORTNESS OF BREATH: 0
BREAST MASS: 0
SINUS CONGESTION: 0
WEIGHT LOSS: 0
POOR WOUND HEALING: 0
SEIZURES: 0
NECK PAIN: 0
INSOMNIA: 0
EYE IRRITATION: 0
TACHYCARDIA: 0

## 2018-06-25 NOTE — LETTER
"2018       RE: Megha Hyman  [address] [facility] [address]     Dear Colleague,    Thank you for referring your patient, Megha Hyman, to the [facility] MEDICAL WEIGHT MANAGEMENT at [facility]. Please see a copy of my visit note below.    Return Medical Weight Management Note     Megha Hyman  MRN:  [id number]  :  1955  JEAN:  18    Dear primary care provider,     I had the pleasure of seeing your patient Megha Hyman.  She is a 62 year old female who I am continuing to see for treatment of obesity related to: type 2 diabetes, STEPHANIE, back pain, knee pain    INTERVAL HISTORY:  Lost down to 224. Ran out of med. Gained back weight. Went back on topiramate May 2018. Has been losing weight since.   She is losing her job and will be unemployed after August.     For her diabetes, She is on glipizide 10 mg daily, metformin 750 mg daily, Jardiance 25 mg daily, Basal (glargine) insulin 28 units daily, Trulicity 1.5 mg weekly. A1c is 6.6.     CURRENT WEIGHT:   236 lbs 0 oz    Wt Readings from Last 4 Encounters:   18 107 kg (236 lb)   18 104.2 kg (229 lb 11.2 oz)   17 108.5 kg (239 lb 4.8 oz)   17 109.7 kg (241 lb 14.4 oz)       Height:  5' 5\"  Body Mass Index:  Body mass index is 39.27 kg/(m^2).  Vitals:  B/P: 99/61, P: 79,     Initial consult weight was 263 on 2016.  Weight change since last seen on 2018 is up 7 pounds.   Total loss is 27 pounds.    Diet and Activity Changes Since Last Visit Reviewed With Patient 2018   I have made the following changes to my diet since my last visit: eatting more fruit instead of chips   With regards to my diet, I am still struggling with: sweets   For breakfast, I typically eat: -   For lunch, I typically eat: -   For supper, I typically eat: -   For snack(s), I typically eat: -   I have made the following changes to my activity/exercise since my last visit: none "   With regards to my activity/exercise, I am still struggling with: walking on my days off have so many appointments that its dark by the time i get home       Review of Systems     Constitutional:  Negative for fever, chills, weight loss, weight gain, fatigue, decreased appetite, night sweats, recent stressors, height gain, height loss, post-operative complications, incisional pain, hallucinations, increased energy, hyperactivity and confused.   HENT:  Negative for ear pain, hearing loss, tinnitus, nosebleeds, trouble swallowing, hoarse voice, mouth sores, sore throat, ear discharge, tooth pain, gum tenderness, taste disturbance, smell disturbance, hearing aid, bleeding gums, dry mouth, sinus pain, sinus congestion and neck mass.    Eyes:  Negative for double vision, pain, redness, eye pain, decreased vision, eye watering, eye bulging, eye dryness, flashing lights, spots, floaters, strabismus, tunnel vision, jaundice and eye irritation.   Respiratory:   Negative for cough, hemoptysis, sputum production, shortness of breath, wheezing, sleep disturbances due to breathing, snores loudly, respiratory pain, dyspnea on exertion, cough disturbing sleep and postural dyspnea.    Cardiovascular:  Negative for chest pain, dyspnea on exertion, palpitations, orthopnea, claudication, leg swelling, fingers/toes turn blue, hypertension, hypotension, syncope, history of heart murmur, chest pain on exertion, chest pain at rest, pacemaker, few scattered varicosities, leg pain, sleep disturbances due to breathing, tachycardia, light-headedness, exercise intolerance and edema.   Gastrointestinal:  Negative for heartburn, nausea, vomiting, abdominal pain, diarrhea, constipation, blood in stool, melena, rectal pain, bloating, hemorrhoids, bowel incontinence, jaundice, rectal bleeding, coffee ground emesis and change in stool.   Genitourinary:  Negative for bladder incontinence, dysuria, urgency, hematuria, flank pain, vaginal discharge,  difficulty urinating, genital sores, dyspareunia, decreased libido, nocturia, voiding less frequently, arousal difficulty, abnormal vaginal bleeding, excessive menstruation, menstrual changes, hot flashes, vaginal dryness and postmenopausal bleeding.   Musculoskeletal:  Positive for back pain, arthralgias and muscle cramps. Negative for myalgias, joint swelling, stiffness, neck pain, bone pain, muscle weakness and fracture.   Skin:  Negative for nail changes, itching, poor wound healing, rash, hair changes, skin changes, acne, warts, poor wound healing, scarring, flaky skin, Raynaud's phenomenon, sensitivity to sunlight and skin thickening.   Neurological:  Negative for dizziness, tingling, tremors, speech change, seizures, loss of consciousness, weakness, light-headedness, numbness, headaches, disturbances in coordination, extremity numbness, memory loss, difficulty walking and paralysis.   Endo/Heme:  Negative for anemia, swollen glands and bruises/bleeds easily.   Psychiatric/Behavioral:  Negative for depression, hallucinations, memory loss, decreased concentration, mood swings and panic attacks.    Breast:  Negative for breast discharge, breast mass, breast pain and nipple retraction.   Endocrine:  Negative for altered temperature regulation, polyphagia, polydipsia, unwanted hair growth and change in facial hair.      MEDICATIONS:   Current Outpatient Prescriptions   Medication Sig Dispense Refill     aspirin EC 81 MG tablet Take 325 mg by mouth       ATORVASTATIN CALCIUM PO Take 40 mg by mouth       calcium carbonate (OS-DARA 500 MG [place]. [place]) 500 MG tablet Take 600 mg by mouth 2 times daily       dorzolamide-timolol (COSOPT) 2-0.5 % ophthalmic solution 1 drop 2 times daily       dulaglutide (TRULICITY) 1.5 MG/0.5ML pen Inject 1.5 mg Subcutaneous       empagliflozin (JARDIANCE) 25 MG TABS tablet Take 25 mg by mouth daily       glipiZIDE (GLUCOTROL) 10 MG tablet Take 10 mg by mouth       latanoprost (XALATAN)  0.005 % ophthalmic solution 1 drop At Bedtime       lisinopril (PRINIVIL,ZESTRIL) 5 MG tablet Take 5 mg by mouth       MELOXICAM PO Take 7.5 mg by mouth       metFORMIN (GLUCOPHAGE-XR) 750 MG 24 hr tablet Take 750 mg by mouth       omeprazole (PRILOSEC) 20 MG capsule Take 20 mg by mouth       omeprazole (RA OMEPRAZOLE) 20 MG tablet Take 20 mg by mouth       oxybutynin (DITROPAN-XL) 10 MG 24 hr tablet TAKE ONE TABLET BY MOUTH ONE TIME DAILY        Pregabalin (LYRICA PO) Take 50 mg by mouth       timolol (TIMOPTIC) 0.5 % ophthalmic solution 1 drop 2 times daily       TOLTERODINE TARTRATE PO Take 4 mg by mouth       topiramate (TOPAMAX) 50 MG tablet 50 mg in the morning and 100 mg in the evening 90 tablet 5     [DISCONTINUED] topiramate (TOPAMAX) 50 MG tablet 50 mg in the morning and 100 mg in the evening 270 tablet 2         Weight Loss Medication History Reviewed With Patient 6/25/2018   Which weight loss medications are you currently taking on a regular basis?  Topamax (topiramate)   Are you having any side effects from the weight loss medication that we have prescribed you? No     Lab reviewed    ASSESSMENT:   Megha Hyman is a 62 year old female who I am continuing to see for treatment of obesity related to: type 2 diabetes, STEPHANIE, back pain, knee pain    Responds well with topiramate.     PLAN:   - continue topiramate to 150 mg daily   - continue to focus on reducing carb and sweet  - continue with smaller food portion  - continue with walking    2. DYSURIA:  Check UA with reflex to culture     FOLLOW-UP:    3 months to see me.    Time: 20 min spent on evaluation, management, counseling, education, & motivational interviewing with greater than 50 % of the total time was spent on counseling and coordinating care    Sincerely,    Jerry Gomez MD

## 2018-06-25 NOTE — PROGRESS NOTES
"Return Medical Weight Management Note     Megha Hyman  MRN:  [id number]  :  1955  JEAN:  18    Dear primary care provider,     I had the pleasure of seeing your patient Megha Hyman.  She is a 62 year old female who I am continuing to see for treatment of obesity related to: type 2 diabetes, STEPHANIE, back pain, knee pain    INTERVAL HISTORY:  Lost down to 224. Ran out of med. Gained back weight. Went back on topiramate May 2018. Has been losing weight since.   She is losing her job and will be unemployed after August.     For her diabetes, She is on glipizide 10 mg daily, metformin 750 mg daily, Jardiance 25 mg daily, Basal (glargine) insulin 28 units daily, Trulicity 1.5 mg weekly. A1c is 6.6.     CURRENT WEIGHT:   236 lbs 0 oz    Wt Readings from Last 4 Encounters:   18 107 kg (236 lb)   18 104.2 kg (229 lb 11.2 oz)   17 108.5 kg (239 lb 4.8 oz)   17 109.7 kg (241 lb 14.4 oz)       Height:  5' 5\"  Body Mass Index:  Body mass index is 39.27 kg/(m^2).  Vitals:  B/P: 99/61, P: 79,     Initial consult weight was 263 on 2016.  Weight change since last seen on 2018 is up 7 pounds.   Total loss is 27 pounds.    Diet and Activity Changes Since Last Visit Reviewed With Patient 2018   I have made the following changes to my diet since my last visit: eatting more fruit instead of chips   With regards to my diet, I am still struggling with: sweets   For breakfast, I typically eat: -   For lunch, I typically eat: -   For supper, I typically eat: -   For snack(s), I typically eat: -   I have made the following changes to my activity/exercise since my last visit: none   With regards to my activity/exercise, I am still struggling with: walking on my days off have so many appointments that its dark by the time i get home       Review of Systems     Constitutional:  Negative for fever, chills, weight loss, weight gain, fatigue, decreased appetite, night sweats, recent stressors, " height gain, height loss, post-operative complications, incisional pain, hallucinations, increased energy, hyperactivity and confused.   HENT:  Negative for ear pain, hearing loss, tinnitus, nosebleeds, trouble swallowing, hoarse voice, mouth sores, sore throat, ear discharge, tooth pain, gum tenderness, taste disturbance, smell disturbance, hearing aid, bleeding gums, dry mouth, sinus pain, sinus congestion and neck mass.    Eyes:  Negative for double vision, pain, redness, eye pain, decreased vision, eye watering, eye bulging, eye dryness, flashing lights, spots, floaters, strabismus, tunnel vision, jaundice and eye irritation.   Respiratory:   Negative for cough, hemoptysis, sputum production, shortness of breath, wheezing, sleep disturbances due to breathing, snores loudly, respiratory pain, dyspnea on exertion, cough disturbing sleep and postural dyspnea.    Cardiovascular:  Negative for chest pain, dyspnea on exertion, palpitations, orthopnea, claudication, leg swelling, fingers/toes turn blue, hypertension, hypotension, syncope, history of heart murmur, chest pain on exertion, chest pain at rest, pacemaker, few scattered varicosities, leg pain, sleep disturbances due to breathing, tachycardia, light-headedness, exercise intolerance and edema.   Gastrointestinal:  Negative for heartburn, nausea, vomiting, abdominal pain, diarrhea, constipation, blood in stool, melena, rectal pain, bloating, hemorrhoids, bowel incontinence, jaundice, rectal bleeding, coffee ground emesis and change in stool.   Genitourinary:  Negative for bladder incontinence, dysuria, urgency, hematuria, flank pain, vaginal discharge, difficulty urinating, genital sores, dyspareunia, decreased libido, nocturia, voiding less frequently, arousal difficulty, abnormal vaginal bleeding, excessive menstruation, menstrual changes, hot flashes, vaginal dryness and postmenopausal bleeding.   Musculoskeletal:  Positive for back pain, arthralgias and  muscle cramps. Negative for myalgias, joint swelling, stiffness, neck pain, bone pain, muscle weakness and fracture.   Skin:  Negative for nail changes, itching, poor wound healing, rash, hair changes, skin changes, acne, warts, poor wound healing, scarring, flaky skin, Raynaud's phenomenon, sensitivity to sunlight and skin thickening.   Neurological:  Negative for dizziness, tingling, tremors, speech change, seizures, loss of consciousness, weakness, light-headedness, numbness, headaches, disturbances in coordination, extremity numbness, memory loss, difficulty walking and paralysis.   Endo/Heme:  Negative for anemia, swollen glands and bruises/bleeds easily.   Psychiatric/Behavioral:  Negative for depression, hallucinations, memory loss, decreased concentration, mood swings and panic attacks.    Breast:  Negative for breast discharge, breast mass, breast pain and nipple retraction.   Endocrine:  Negative for altered temperature regulation, polyphagia, polydipsia, unwanted hair growth and change in facial hair.      MEDICATIONS:   Current Outpatient Prescriptions   Medication Sig Dispense Refill     aspirin EC 81 MG tablet Take 325 mg by mouth       ATORVASTATIN CALCIUM PO Take 40 mg by mouth       calcium carbonate (OS-DARA 500 MG [place]. [place]) 500 MG tablet Take 600 mg by mouth 2 times daily       dorzolamide-timolol (COSOPT) 2-0.5 % ophthalmic solution 1 drop 2 times daily       dulaglutide (TRULICITY) 1.5 MG/0.5ML pen Inject 1.5 mg Subcutaneous       empagliflozin (JARDIANCE) 25 MG TABS tablet Take 25 mg by mouth daily       glipiZIDE (GLUCOTROL) 10 MG tablet Take 10 mg by mouth       latanoprost (XALATAN) 0.005 % ophthalmic solution 1 drop At Bedtime       lisinopril (PRINIVIL,ZESTRIL) 5 MG tablet Take 5 mg by mouth       MELOXICAM PO Take 7.5 mg by mouth       metFORMIN (GLUCOPHAGE-XR) 750 MG 24 hr tablet Take 750 mg by mouth       omeprazole (PRILOSEC) 20 MG capsule Take 20 mg by mouth       omeprazole (RA  OMEPRAZOLE) 20 MG tablet Take 20 mg by mouth       oxybutynin (DITROPAN-XL) 10 MG 24 hr tablet TAKE ONE TABLET BY MOUTH ONE TIME DAILY        Pregabalin (LYRICA PO) Take 50 mg by mouth       timolol (TIMOPTIC) 0.5 % ophthalmic solution 1 drop 2 times daily       TOLTERODINE TARTRATE PO Take 4 mg by mouth       topiramate (TOPAMAX) 50 MG tablet 50 mg in the morning and 100 mg in the evening 90 tablet 5     [DISCONTINUED] topiramate (TOPAMAX) 50 MG tablet 50 mg in the morning and 100 mg in the evening 270 tablet 2         Weight Loss Medication History Reviewed With Patient 6/25/2018   Which weight loss medications are you currently taking on a regular basis?  Topamax (topiramate)   Are you having any side effects from the weight loss medication that we have prescribed you? No     Lab reviewed    ASSESSMENT:   Megha Hyman is a 62 year old female who I am continuing to see for treatment of obesity related to: type 2 diabetes, STEPHANIE, back pain, knee pain    Responds well with topiramate.     PLAN:   - continue topiramate to 150 mg daily   - continue to focus on reducing carb and sweet  - continue with smaller food portion  - continue with walking    2. DYSURIA:  Check UA with reflex to culture     FOLLOW-UP:    3 months to see me.    Time: 20 min spent on evaluation, management, counseling, education, & motivational interviewing with greater than 50 % of the total time was spent on counseling and coordinating care    Sincerely,    Jerry Gomez MD

## 2018-06-25 NOTE — MR AVS SNAPSHOT
After Visit Summary   2018    Megha Hyman    MRN: [id number]           Patient Information     Date Of Birth          1955        Visit Information        Provider Department      2018 12:20 PM Jerry Gomez MD  Health Medical Weight Management        Today's Diagnoses     Morbid obesity due to excess calories (H)    -  1    Dysuria           Follow-ups after your visit        Follow-up notes from your care team     Return in about 3 months (around 2018).      Future tests that were ordered for you today     Open Future Orders        Priority Expected Expires Ordered    Routine UA with Micro Reflex to Culture Routine  2019            Who to contact     Please call your clinic at [phone] to:    Ask questions about your health    Make or cancel appointments    Discuss your medicines    Learn about your test results    Speak to your doctor            Additional Information About Your Visit        MyChart Information     [employer] is an electronic gateway that provides easy, online access to your medical records. With [employer], you can request a clinic appointment, read your test results, renew a prescription or communicate with your care team.     To sign up for Roambi visit the website at [email or web address]   You will be asked to enter the access code listed below, as well as some personal information. Please follow the directions to create your username and password.     Your access code is: [email or web address]  Expires: 2018  6:30 AM     Your access code will  in 90 days. If you need help or a new code, please contact your [facility] or call [phone] for assistance.        Care EveryWhere ID     This is your Care EveryWhere ID. This could be used by other organizations to access your [facility] medical records  [id number]        Your Vitals Were     Pulse Height Pulse Oximetry BMI (Body Mass Index)        "   83 1.651 m (5' 5\") 97% 39.27 kg/m2         Blood Pressure from Last 3 Encounters:   06/25/18 97/57   01/09/18 96/57   08/01/17 106/63    Weight from Last 3 Encounters:   06/25/18 107 kg (236 lb)   01/09/18 104.2 kg (229 lb 11.2 oz)   08/01/17 108.5 kg (239 lb 4.8 oz)                 Where to get your medicines      These medications were sent to [facility] [address] [facility], [place] - [address] [facility]  [address] [facility] [facility] [address]     Phone:  [phone]     topiramate 50 MG tablet          Primary Care Provider Fax #    Physician No Ref-Primary [phone]       No address on file        Equal Access to Services     RD SALEH : Leonardo Cat, waaxkristi luqadaha, qaybta kaalmakristi amezquita, haven sepulveda . So [facility] [phone].    ATENCIÓN: Si habla español, tiene a swenson disposición servicios gratuitos de asistencia lingüística. Darrel al [phone].    We comply with applicable federal civil rights laws and [place] laws. We do not discriminate on the basis of race, color, national origin, age, disability, sex, sexual orientation, or gender identity.            Thank you!     Thank you for choosing [facility] WEIGHT MANAGEMENT  for your care. Our goal is always to provide you with excellent care. Hearing back from our patients is one way we can continue to improve our services. Please take a few minutes to complete the written survey that you may receive in the mail after your visit with us. Thank you!             Your Updated Medication List - Protect others around you: Learn how to safely use, store and throw away your medicines at www.disposemymeds.org.          This list is accurate as of 6/25/18 12:54 PM.  Always use your most recent med list.                   Brand Name Dispense Instructions for use Diagnosis    aspirin 81 MG EC tablet      Take 325 mg by mouth        ATORVASTATIN CALCIUM PO      Take 40 mg by mouth        calcium " carbonate 500 MG tablet    OS-DARA 500 mg [place]. [place]     Take 600 mg by mouth 2 times daily        dorzolamide-timolol 2-0.5 % ophthalmic solution    COSOPT     1 drop 2 times daily        dulaglutide 1.5 MG/0.5ML pen    TRULICITY     Inject 1.5 mg Subcutaneous        glipiZIDE 10 MG tablet    GLUCOTROL     Take 10 mg by mouth        JARDIANCE 25 MG Tabs tablet   Generic drug:  empagliflozin      Take 25 mg by mouth daily        latanoprost 0.005 % ophthalmic solution    XALATAN     1 drop At Bedtime        lisinopril 5 MG tablet    PRINIVIL/ZESTRIL     Take 5 mg by mouth        LYRICA PO      Take 50 mg by mouth        MELOXICAM PO      Take 7.5 mg by mouth        metFORMIN 750 MG 24 hr tablet    GLUCOPHAGE-XR     Take 750 mg by mouth        oxybutynin 10 MG 24 hr tablet    DITROPAN-XL     TAKE ONE TABLET BY MOUTH ONE TIME DAILY        * RA OMEPRAZOLE 20 MG tablet   Generic drug:  omeprazole      Take 20 mg by mouth        * omeprazole 20 MG CR capsule    priLOSEC     Take 20 mg by mouth        timolol 0.5 % ophthalmic solution    TIMOPTIC     1 drop 2 times daily        TOLTERODINE TARTRATE PO      Take 4 mg by mouth        topiramate 50 MG tablet    TOPAMAX    90 tablet    50 mg in the morning and 100 mg in the evening    Morbid obesity due to excess calories (H)       * Notice:  This list has 2 medication(s) that are the same as other medications prescribed for you. Read the directions carefully, and ask your doctor or other care provider to review them with you.

## 2018-06-25 NOTE — NURSING NOTE
"  Chief Complaint   Patient presents with     Weight Problem     RMWM     Vitals:    06/25/18 1224   BP: 97/57   Pulse: 83   SpO2: 97%   Weight: 236 lb   Height: 5' 5\"     Body mass index is 39.27 kg/(m^2).  Gillian Alvarez CMA    "

## 2018-06-27 ENCOUNTER — TELEPHONE (OUTPATIENT)
Dept: ENDOCRINOLOGY | Facility: CLINIC | Age: 63
End: 2018-06-27

## 2018-06-27 DIAGNOSIS — N30.00 ACUTE CYSTITIS WITHOUT HEMATURIA: Primary | ICD-10-CM

## 2018-06-27 LAB
BACTERIA SPEC CULT: ABNORMAL
Lab: ABNORMAL
SPECIMEN SOURCE: ABNORMAL

## 2018-06-27 RX ORDER — NITROFURANTOIN 25; 75 MG/1; MG/1
100 CAPSULE ORAL 2 TIMES DAILY
Qty: 10 CAPSULE | Refills: 0 | Status: SHIPPED | OUTPATIENT
Start: 2018-06-27

## 2018-06-27 NOTE — TELEPHONE ENCOUNTER
This patient has a UTI. I ordered a urine culture on Monday when I saw her and iti s growing E.coli. Please call her and let her know I called in an antibiotic for her (Macrobid). Take 1 tab BID for 5 days.     Jerry Gomez MD  Endocrinology, Diabetes and Metabolism  [facility]

## 2018-06-27 NOTE — TELEPHONE ENCOUNTER
Called and left message for patient informing her that Dr. Gomez has sent in a prescription for an antibiotic for UTI. Instructions given. Direct number left for questions.

## 2018-09-22 ENCOUNTER — PATIENT OUTREACH (OUTPATIENT)
Dept: CARE COORDINATION | Facility: CLINIC | Age: 63
End: 2018-09-22

## 2019-01-04 DIAGNOSIS — E66.01 MORBID OBESITY DUE TO EXCESS CALORIES (H): ICD-10-CM

## 2019-01-04 RX ORDER — TOPIRAMATE 50 MG/1
TABLET, FILM COATED ORAL
Qty: 90 TABLET | Refills: 0 | Status: SHIPPED | OUTPATIENT
Start: 2019-01-04 | End: 2019-02-12

## 2019-02-08 DIAGNOSIS — E66.01 MORBID OBESITY DUE TO EXCESS CALORIES (H): ICD-10-CM

## 2019-02-11 RX ORDER — TOPIRAMATE 50 MG/1
TABLET, FILM COATED ORAL
Qty: 90 TABLET | Refills: 0 | OUTPATIENT
Start: 2019-02-11

## 2019-02-12 DIAGNOSIS — E66.01 MORBID OBESITY DUE TO EXCESS CALORIES (H): ICD-10-CM

## 2019-02-12 RX ORDER — TOPIRAMATE 50 MG/1
TABLET, FILM COATED ORAL
Qty: 90 TABLET | Refills: 0 | Status: SHIPPED | OUTPATIENT
Start: 2019-02-12 | End: 2019-03-11

## 2019-02-12 NOTE — TELEPHONE ENCOUNTER
TOPIRAMATE  50 mg in the morning and 100 mg in the evening  Last Written Prescription Date:  1/4/2019  Last Fill Quantity: 90,   # refills: 0  Last Office Visit : 6/25/2018  Future Office visit:  3/11/2019 ( cancel x 2)    Routing refill request to provider for review/approval because:  Drug not on  refill protocol / controlled substance

## 2019-02-12 NOTE — TELEPHONE ENCOUNTER
Refill refused. Pharmacy called and notified.  Message was sent to clinic coordinator for scheduling.

## 2019-02-12 NOTE — TELEPHONE ENCOUNTER
Reason for call:  Other   Patient called regarding (reason for call): appointment  Additional comments: Patient called to make appointment and is wondering if she can get a refill until her appointment date.    Phone number to reach patient:  Home number on file [phone] (home)    Best Time:  Any    Can we leave a detailed message on this number?  YES

## 2019-03-11 ENCOUNTER — OFFICE VISIT (OUTPATIENT)
Dept: ENDOCRINOLOGY | Facility: CLINIC | Age: 64
End: 2019-03-11
Payer: COMMERCIAL

## 2019-03-11 VITALS
WEIGHT: 237.8 LBS | OXYGEN SATURATION: 99 % | HEIGHT: 65 IN | HEART RATE: 78 BPM | DIASTOLIC BLOOD PRESSURE: 59 MMHG | BODY MASS INDEX: 39.62 KG/M2 | TEMPERATURE: 98.5 F | SYSTOLIC BLOOD PRESSURE: 92 MMHG

## 2019-03-11 DIAGNOSIS — E66.01 MORBID OBESITY DUE TO EXCESS CALORIES (H): Primary | ICD-10-CM

## 2019-03-11 RX ORDER — TOPIRAMATE 50 MG/1
100 TABLET, FILM COATED ORAL 2 TIMES DAILY
Qty: 360 TABLET | Refills: 3 | Status: SHIPPED | OUTPATIENT
Start: 2019-03-11

## 2019-03-11 RX ORDER — INSULIN GLARGINE 100 [IU]/ML
28 INJECTION, SOLUTION SUBCUTANEOUS AT BEDTIME
COMMUNITY
Start: 2019-02-06

## 2019-03-11 RX ORDER — FUROSEMIDE 20 MG
20 TABLET ORAL DAILY
COMMUNITY
Start: 2019-02-18

## 2019-03-11 RX ORDER — ASPIRIN 325 MG
325 TABLET ORAL DAILY
COMMUNITY

## 2019-03-11 ASSESSMENT — ENCOUNTER SYMPTOMS
COUGH DISTURBING SLEEP: 0
TREMORS: 0
HYPOTENSION: 0
PANIC: 0
DECREASED APPETITE: 0
ALTERED TEMPERATURE REGULATION: 0
INSOMNIA: 0
DECREASED CONCENTRATION: 0
DEPRESSION: 0
VOMITING: 0
BACK PAIN: 1
INCREASED ENERGY: 0
SORE THROAT: 0
FLANK PAIN: 0
FATIGUE: 0
EXERCISE INTOLERANCE: 0
POLYDIPSIA: 0
ARTHRALGIAS: 1
DOUBLE VISION: 0
SINUS CONGESTION: 0
WEAKNESS: 0
MYALGIAS: 0
MUSCLE WEAKNESS: 0
WHEEZING: 0
MEMORY LOSS: 0
PALPITATIONS: 0
ABDOMINAL PAIN: 0
CHILLS: 0
FEVER: 0
LEG PAIN: 0
NERVOUS/ANXIOUS: 0
SWOLLEN GLANDS: 0
EYE WATERING: 0
MUSCLE CRAMPS: 1
NECK MASS: 0
HEMOPTYSIS: 0
PARALYSIS: 0
HEMATURIA: 0
ORTHOPNEA: 0
EYE PAIN: 0
CLAUDICATION: 0
DISTURBANCES IN COORDINATION: 0
SLEEP DISTURBANCES DUE TO BREATHING: 0
BREAST MASS: 0
RESPIRATORY PAIN: 0
LIGHT-HEADEDNESS: 0
WEIGHT GAIN: 0
POSTURAL DYSPNEA: 0
NAIL CHANGES: 0
TROUBLE SWALLOWING: 0
SNORES LOUDLY: 0
HOT FLASHES: 0
RECTAL BLEEDING: 0
COUGH: 0
BREAST PAIN: 0
HEADACHES: 0
SYNCOPE: 0
POOR WOUND HEALING: 0
BLOATING: 0
HEARTBURN: 0
SKIN CHANGES: 0
BOWEL INCONTINENCE: 0
EXTREMITY NUMBNESS: 0
WEIGHT LOSS: 0
BLOOD IN STOOL: 0
BRUISES/BLEEDS EASILY: 0
LOSS OF CONSCIOUSNESS: 0
DIARRHEA: 0
DYSURIA: 0
CONSTIPATION: 0
DIZZINESS: 0
EYE IRRITATION: 0
SMELL DISTURBANCE: 0
SHORTNESS OF BREATH: 0
TASTE DISTURBANCE: 0
TACHYCARDIA: 0
JOINT SWELLING: 0
STIFFNESS: 0
POLYPHAGIA: 0
DYSPNEA ON EXERTION: 0
TINGLING: 0
NAUSEA: 0
RECTAL PAIN: 0
HALLUCINATIONS: 0
SPEECH CHANGE: 0
EYE REDNESS: 0
LEG SWELLING: 0
NUMBNESS: 0
NECK PAIN: 0
SINUS PAIN: 0
HOARSE VOICE: 0
HYPERTENSION: 0
SEIZURES: 0
NIGHT SWEATS: 0
DECREASED LIBIDO: 0
DIFFICULTY URINATING: 0
JAUNDICE: 0
SPUTUM PRODUCTION: 0

## 2019-03-11 ASSESSMENT — PAIN SCALES - GENERAL: PAINLEVEL: NO PAIN (0)

## 2019-03-11 ASSESSMENT — MIFFLIN-ST. JEOR: SCORE: 1644.53

## 2019-03-11 NOTE — LETTER
"3/11/2019       RE: Megha Hyman  [address] [place] [address]     Dear Colleague,    Thank you for referring your patient, Megha Hyman, to the [facility] MEDICAL WEIGHT MANAGEMENT at [facility]. Please see a copy of my visit note below.    Return Medical Weight Management Note     Megha Hyman  MRN:  [id number]  :  1955  JEAN:  18    Dear primary care provider,     I had the pleasure of seeing your patient Megha Hyman.  She is a 62 year old female who I am continuing to see for treatment of obesity related to: type 2 diabetes, STEPHANIE, back pain, knee pain    INTERVAL HISTORY:  Weight is at a standstill. She is under a lot fo stress at home.   Has not been able to follow any specific eating plan due to lots of stress.   Meds: Topiramate 50 mg in AM, 100 mg at hs    For her diabetes, She is on glipizide 10 mg daily, metformin 750 mg daily, Jardiance 25 mg daily, Basal (glargine) insulin 28 units daily, Trulicity 1.5 mg weekly. A1c is 6.9. Sees Dr. Eisenberg, Endocrine at [facility].     CURRENT WEIGHT:   237 lbs 12.8 oz    Wt Readings from Last 4 Encounters:   19 107.9 kg (237 lb 12.8 oz)   18 107 kg (236 lb)   18 104.2 kg (229 lb 11.2 oz)   17 108.5 kg (239 lb 4.8 oz)       Height:  5' 5\"  Body Mass Index:  Body mass index is 39.57 kg/m .  Vitals:  B/P: 99/61, P: 79,     Initial consult weight was 263 on 2016.  Weight change since last seen on 2018 is up 1 pounds.   Total loss is 26 pounds.    Diet and Activity Changes Since Last Visit Reviewed With Patient 3/11/2019   I have made the following changes to my diet since my last visit: none   With regards to my diet, I am still struggling with: sweet   For breakfast, I typically eat: -   For lunch, I typically eat: -   For supper, I typically eat: -   For snack(s), I typically eat: -   I have made the following changes to my activity/exercise since my " last visit: no more assces to a health club   With regards to my activity/exercise, I am still struggling with: going out walking       Review of Systems     Constitutional:  Negative for fever, chills, weight loss, weight gain, fatigue, decreased appetite, night sweats, recent stressors, height gain, height loss, post-operative complications, incisional pain, hallucinations, increased energy, hyperactivity and confused.   HENT:  Negative for ear pain, hearing loss, tinnitus, nosebleeds, trouble swallowing, hoarse voice, mouth sores, sore throat, ear discharge, tooth pain, gum tenderness, taste disturbance, smell disturbance, hearing aid, bleeding gums, dry mouth, sinus pain, sinus congestion and neck mass.    Eyes:  Negative for double vision, pain, redness, eye pain, decreased vision, eye watering, eye bulging, eye dryness, flashing lights, spots, floaters, strabismus, tunnel vision, jaundice and eye irritation.   Respiratory:   Negative for cough, hemoptysis, sputum production, shortness of breath, wheezing, sleep disturbances due to breathing, snores loudly, respiratory pain, dyspnea on exertion, cough disturbing sleep and postural dyspnea.    Cardiovascular:  Negative for chest pain, dyspnea on exertion, palpitations, orthopnea, claudication, leg swelling, fingers/toes turn blue, hypertension, hypotension, syncope, history of heart murmur, chest pain on exertion, chest pain at rest, pacemaker, few scattered varicosities, leg pain, sleep disturbances due to breathing, tachycardia, light-headedness, exercise intolerance and edema.   Gastrointestinal:  Negative for heartburn, nausea, vomiting, abdominal pain, diarrhea, constipation, blood in stool, melena, rectal pain, bloating, hemorrhoids, bowel incontinence, jaundice, rectal bleeding, coffee ground emesis and change in stool.   Genitourinary:  Negative for bladder incontinence, dysuria, urgency, hematuria, flank pain, vaginal discharge, difficulty urinating,  genital sores, dyspareunia, decreased libido, nocturia, voiding less frequently, arousal difficulty, abnormal vaginal bleeding, excessive menstruation, menstrual changes, hot flashes, vaginal dryness and postmenopausal bleeding.   Musculoskeletal:  Positive for back pain, arthralgias and muscle cramps. Negative for myalgias, joint swelling, stiffness, neck pain, bone pain, muscle weakness and fracture.   Skin:  Negative for nail changes, itching, poor wound healing, rash, hair changes, skin changes, acne, warts, poor wound healing, scarring, flaky skin, Raynaud's phenomenon, sensitivity to sunlight and skin thickening.   Neurological:  Negative for dizziness, tingling, tremors, speech change, seizures, loss of consciousness, weakness, light-headedness, numbness, headaches, disturbances in coordination, extremity numbness, memory loss, difficulty walking and paralysis.   Endo/Heme:  Negative for anemia, swollen glands and bruises/bleeds easily.   Psychiatric/Behavioral:  Negative for depression, hallucinations, memory loss, decreased concentration, mood swings and panic attacks.    Breast:  Negative for breast discharge, breast mass, breast pain and nipple retraction.   Endocrine:  Negative for altered temperature regulation, polyphagia, polydipsia, unwanted hair growth and change in facial hair.      MEDICATIONS:   Current Outpatient Medications   Medication Sig Dispense Refill     aspirin (STAR ASPIRIN) 325 MG tablet Take 325 mg by mouth daily       ATORVASTATIN CALCIUM PO Take 40 mg by mouth       dorzolamide-timolol (COSOPT) 2-0.5 % ophthalmic solution 1 drop 2 times daily       dulaglutide (TRULICITY) 1.5 MG/0.5ML pen Inject 1.5 mg Subcutaneous       empagliflozin (JARDIANCE) 25 MG TABS tablet Take 25 mg by mouth daily       furosemide (LASIX) 20 MG tablet Take 20 mg by mouth daily       glipiZIDE (GLUCOTROL) 10 MG tablet Take 10 mg by mouth       insulin glargine (BASAGLAR KWIKPEN) 100 UNIT/ML pen Inject 28  Units Subcutaneous At Bedtime       latanoprost (XALATAN) 0.005 % ophthalmic solution 1 drop At Bedtime       lisinopril (PRINIVIL,ZESTRIL) 5 MG tablet Take 5 mg by mouth       MELOXICAM PO Take 7.5 mg by mouth       metFORMIN (GLUCOPHAGE-XR) 750 MG 24 hr tablet Take 750 mg by mouth       omeprazole (PRILOSEC) 20 MG capsule Take 20 mg by mouth       oxybutynin (DITROPAN-XL) 10 MG 24 hr tablet TAKE ONE TABLET BY MOUTH ONE TIME DAILY        Pregabalin (LYRICA PO) Take 50 mg by mouth       timolol (TIMOPTIC) 0.5 % ophthalmic solution 1 drop 2 times daily       TOLTERODINE TARTRATE PO Take 4 mg by mouth       topiramate (TOPAMAX) 50 MG tablet 50 mg in the morning and 100 mg in the evening 90 tablet 0     calcium carbonate (OS-DARA 500 MG [place]. [place]) 500 MG tablet Take 600 mg by mouth 2 times daily       nitroFURantoin, macrocrystal-monohydrate, (MACROBID) 100 MG capsule Take 1 capsule (100 mg) by mouth 2 times daily (Patient not taking: Reported on 3/11/2019) 10 capsule 0     omeprazole (RA OMEPRAZOLE) 20 MG tablet Take 20 mg by mouth           Weight Loss Medication History Reviewed With Patient 3/11/2019   Which weight loss medications are you currently taking on a regular basis?  Topamax (topiramate)   Are you having any side effects from the weight loss medication that we have prescribed you? No     Lab reviewed    ASSESSMENT:   #1: OBESITY  #2: Type 2 diabetes    Megha Hyman is a 62 year old female who I am continuing to see for treatment of obesity related to: type 2 diabetes, STEPHANIE, back pain, knee pain    Responds well with topiramate.     PLAN:   - increase topiramate to 100 mg BID  - for her diabetes, I suggested that she discuss with her primary Endocrinologist: to switch from Trilicity to Ozempic, and to discontinue glipizide.     FOLLOW-UP:    3 months    Time: 25 min spent on evaluation, management, counseling, education, & motivational interviewing with greater than 50 % of the total time was spent on  counseling and coordinating care    Sincerely,    Jerry Gomez MD

## 2019-03-11 NOTE — PROGRESS NOTES
"Return Medical Weight Management Note     Megha Hyman  MRN:  [id number]  :  1955  JEAN:  20    Dear primary care provider,     I had the pleasure of seeing your patient Megha Hyman.  She is a 62 year old female who I am continuing to see for treatment of obesity related to: type 2 diabetes, STEPHANIE, back pain, knee pain    INTERVAL HISTORY:  Weight is at a standstill. She is under a lot fo stress at home.   Has not been able to follow any specific eating plan due to lots of stress.   Meds: Topiramate 50 mg in AM, 100 mg at hs    For her diabetes, She is on glipizide 10 mg daily, metformin 750 mg daily, Jardiance 25 mg daily, Basal (glargine) insulin 28 units daily, Trulicity 1.5 mg weekly. A1c is 6.9. Sees Dr. Eisenberg, Endocrine at [facility].     CURRENT WEIGHT:   237 lbs 12.8 oz    Wt Readings from Last 4 Encounters:   19 107.9 kg (237 lb 12.8 oz)   18 107 kg (236 lb)   18 104.2 kg (229 lb 11.2 oz)   17 108.5 kg (239 lb 4.8 oz)       Height:  5' 5\"  Body Mass Index:  Body mass index is 39.57 kg/m .  Vitals:  B/P: 99/61, P: 79,     Initial consult weight was 263 on 2016.  Weight change since last seen on 2018 is up 1 pounds.   Total loss is 26 pounds.    Diet and Activity Changes Since Last Visit Reviewed With Patient 3/11/2019   I have made the following changes to my diet since my last visit: none   With regards to my diet, I am still struggling with: sweet   For breakfast, I typically eat: -   For lunch, I typically eat: -   For supper, I typically eat: -   For snack(s), I typically eat: -   I have made the following changes to my activity/exercise since my last visit: no more assces to a health club   With regards to my activity/exercise, I am still struggling with: going out walking       Review of Systems     Constitutional:  Negative for fever, chills, weight loss, weight gain, fatigue, decreased appetite, night sweats, recent stressors, height gain, height " loss, post-operative complications, incisional pain, hallucinations, increased energy, hyperactivity and confused.   HENT:  Negative for ear pain, hearing loss, tinnitus, nosebleeds, trouble swallowing, hoarse voice, mouth sores, sore throat, ear discharge, tooth pain, gum tenderness, taste disturbance, smell disturbance, hearing aid, bleeding gums, dry mouth, sinus pain, sinus congestion and neck mass.    Eyes:  Negative for double vision, pain, redness, eye pain, decreased vision, eye watering, eye bulging, eye dryness, flashing lights, spots, floaters, strabismus, tunnel vision, jaundice and eye irritation.   Respiratory:   Negative for cough, hemoptysis, sputum production, shortness of breath, wheezing, sleep disturbances due to breathing, snores loudly, respiratory pain, dyspnea on exertion, cough disturbing sleep and postural dyspnea.    Cardiovascular:  Negative for chest pain, dyspnea on exertion, palpitations, orthopnea, claudication, leg swelling, fingers/toes turn blue, hypertension, hypotension, syncope, history of heart murmur, chest pain on exertion, chest pain at rest, pacemaker, few scattered varicosities, leg pain, sleep disturbances due to breathing, tachycardia, light-headedness, exercise intolerance and edema.   Gastrointestinal:  Negative for heartburn, nausea, vomiting, abdominal pain, diarrhea, constipation, blood in stool, melena, rectal pain, bloating, hemorrhoids, bowel incontinence, jaundice, rectal bleeding, coffee ground emesis and change in stool.   Genitourinary:  Negative for bladder incontinence, dysuria, urgency, hematuria, flank pain, vaginal discharge, difficulty urinating, genital sores, dyspareunia, decreased libido, nocturia, voiding less frequently, arousal difficulty, abnormal vaginal bleeding, excessive menstruation, menstrual changes, hot flashes, vaginal dryness and postmenopausal bleeding.   Musculoskeletal:  Positive for back pain, arthralgias and muscle cramps. Negative  for myalgias, joint swelling, stiffness, neck pain, bone pain, muscle weakness and fracture.   Skin:  Negative for nail changes, itching, poor wound healing, rash, hair changes, skin changes, acne, warts, poor wound healing, scarring, flaky skin, Raynaud's phenomenon, sensitivity to sunlight and skin thickening.   Neurological:  Negative for dizziness, tingling, tremors, speech change, seizures, loss of consciousness, weakness, light-headedness, numbness, headaches, disturbances in coordination, extremity numbness, memory loss, difficulty walking and paralysis.   Endo/Heme:  Negative for anemia, swollen glands and bruises/bleeds easily.   Psychiatric/Behavioral:  Negative for depression, hallucinations, memory loss, decreased concentration, mood swings and panic attacks.    Breast:  Negative for breast discharge, breast mass, breast pain and nipple retraction.   Endocrine:  Negative for altered temperature regulation, polyphagia, polydipsia, unwanted hair growth and change in facial hair.      MEDICATIONS:   Current Outpatient Medications   Medication Sig Dispense Refill     aspirin (STAR ASPIRIN) 325 MG tablet Take 325 mg by mouth daily       ATORVASTATIN CALCIUM PO Take 40 mg by mouth       dorzolamide-timolol (COSOPT) 2-0.5 % ophthalmic solution 1 drop 2 times daily       dulaglutide (TRULICITY) 1.5 MG/0.5ML pen Inject 1.5 mg Subcutaneous       empagliflozin (JARDIANCE) 25 MG TABS tablet Take 25 mg by mouth daily       furosemide (LASIX) 20 MG tablet Take 20 mg by mouth daily       glipiZIDE (GLUCOTROL) 10 MG tablet Take 10 mg by mouth       insulin glargine (BASAGLAR KWIKPEN) 100 UNIT/ML pen Inject 28 Units Subcutaneous At Bedtime       latanoprost (XALATAN) 0.005 % ophthalmic solution 1 drop At Bedtime       lisinopril (PRINIVIL,ZESTRIL) 5 MG tablet Take 5 mg by mouth       MELOXICAM PO Take 7.5 mg by mouth       metFORMIN (GLUCOPHAGE-XR) 750 MG 24 hr tablet Take 750 mg by mouth       omeprazole (PRILOSEC) 20  MG capsule Take 20 mg by mouth       oxybutynin (DITROPAN-XL) 10 MG 24 hr tablet TAKE ONE TABLET BY MOUTH ONE TIME DAILY        Pregabalin (LYRICA PO) Take 50 mg by mouth       timolol (TIMOPTIC) 0.5 % ophthalmic solution 1 drop 2 times daily       TOLTERODINE TARTRATE PO Take 4 mg by mouth       topiramate (TOPAMAX) 50 MG tablet 50 mg in the morning and 100 mg in the evening 90 tablet 0     calcium carbonate (OS-DARA 500 MG [place]. [place]) 500 MG tablet Take 600 mg by mouth 2 times daily       nitroFURantoin, macrocrystal-monohydrate, (MACROBID) 100 MG capsule Take 1 capsule (100 mg) by mouth 2 times daily (Patient not taking: Reported on 3/11/2019) 10 capsule 0     omeprazole (RA OMEPRAZOLE) 20 MG tablet Take 20 mg by mouth           Weight Loss Medication History Reviewed With Patient 3/11/2019   Which weight loss medications are you currently taking on a regular basis?  Topamax (topiramate)   Are you having any side effects from the weight loss medication that we have prescribed you? No     Lab reviewed    ASSESSMENT:   #1: OBESITY  #2: Type 2 diabetes    Megha Hyman is a 62 year old female who I am continuing to see for treatment of obesity related to: type 2 diabetes, STEPHANIE, back pain, knee pain    Responds well with topiramate.     PLAN:   - increase topiramate to 100 mg BID  - for her diabetes, I suggested that she discuss with her primary Endocrinologist: to switch from Trilicity to Ozempic, and to discontinue glipizide.     FOLLOW-UP:    3 months    Time: 25 min spent on evaluation, management, counseling, education, & motivational interviewing with greater than 50 % of the total time was spent on counseling and coordinating care    Sincerely,    Jerry Gomez MD

## 2019-03-11 NOTE — PATIENT INSTRUCTIONS
Consider switching from Trulicity to Ozempic (Semaglutide) for better effect on diabetes and weight loss.   Advantage: to be able to wean off glipizide plus lose more weight.       Jerry Gomez MD  Endocrinology, Diabetes and Metabolism  [facility]

## 2019-03-11 NOTE — NURSING NOTE
"Chief Complaint   Patient presents with     Weight Problem     RMWM       Vitals:    03/11/19 1318   BP: 92/59   BP Location: Right arm   Patient Position: Chair   Cuff Size: Adult Large   Pulse: 78   Temp: 98.5  F (36.9  C)   TempSrc: Oral   SpO2: 99%   Weight: 107.9 kg (237 lb 12.8 oz)   Height: 1.651 m (5' 5\")       Body mass index is 39.57 kg/m .    Ct Nieto CMA    "

## 2020-03-23 DIAGNOSIS — E66.01 MORBID OBESITY DUE TO EXCESS CALORIES (H): ICD-10-CM

## 2020-03-24 RX ORDER — TOPIRAMATE 50 MG/1
100 TABLET, FILM COATED ORAL 2 TIMES DAILY
Qty: 360 TABLET | Refills: 3 | OUTPATIENT
Start: 2020-03-24

## 2020-03-24 NOTE — TELEPHONE ENCOUNTER
topiramate (TOPAMAX) 50 MG tablet  Last Written Prescription Date:  3/11/19  Last Fill Quantity: 360,   # refills: 3  Last Office Visit : 3/11/19  Future Office visit:  None      Routing refill request to provider for review/approval because: no appt scheduled.